# Patient Record
Sex: FEMALE | Race: WHITE | NOT HISPANIC OR LATINO
[De-identification: names, ages, dates, MRNs, and addresses within clinical notes are randomized per-mention and may not be internally consistent; named-entity substitution may affect disease eponyms.]

---

## 2019-09-26 ENCOUNTER — APPOINTMENT (OUTPATIENT)
Dept: ORTHOPEDIC SURGERY | Facility: CLINIC | Age: 69
End: 2019-09-26
Payer: COMMERCIAL

## 2019-09-26 VITALS — WEIGHT: 164 LBS | BODY MASS INDEX: 32.2 KG/M2 | HEIGHT: 60 IN

## 2019-09-26 PROCEDURE — 73562 X-RAY EXAM OF KNEE 3: CPT | Mod: 50

## 2019-09-26 PROCEDURE — 20610 DRAIN/INJ JOINT/BURSA W/O US: CPT | Mod: LT

## 2019-09-26 PROCEDURE — 99204 OFFICE O/P NEW MOD 45 MIN: CPT | Mod: 25

## 2019-12-26 ENCOUNTER — APPOINTMENT (OUTPATIENT)
Dept: ORTHOPEDIC SURGERY | Facility: CLINIC | Age: 69
End: 2019-12-26
Payer: COMMERCIAL

## 2019-12-26 VITALS — WEIGHT: 164 LBS | BODY MASS INDEX: 32.2 KG/M2 | HEIGHT: 60 IN

## 2019-12-26 PROCEDURE — 20611 DRAIN/INJ JOINT/BURSA W/US: CPT | Mod: LT

## 2020-01-29 ENCOUNTER — APPOINTMENT (OUTPATIENT)
Dept: ORTHOPEDIC SURGERY | Facility: CLINIC | Age: 70
End: 2020-01-29
Payer: COMMERCIAL

## 2020-01-29 VITALS — HEIGHT: 60 IN | WEIGHT: 164 LBS | BODY MASS INDEX: 32.2 KG/M2

## 2020-01-29 PROCEDURE — 99214 OFFICE O/P EST MOD 30 MIN: CPT

## 2020-03-12 ENCOUNTER — APPOINTMENT (OUTPATIENT)
Dept: ORTHOPEDIC SURGERY | Facility: CLINIC | Age: 70
End: 2020-03-12
Payer: COMMERCIAL

## 2020-03-12 PROCEDURE — 20611 DRAIN/INJ JOINT/BURSA W/US: CPT | Mod: RT

## 2020-03-12 PROCEDURE — 99214 OFFICE O/P EST MOD 30 MIN: CPT | Mod: 25

## 2020-03-12 NOTE — DISCUSSION/SUMMARY
[de-identified] : I went over the pathophysiology of the patient's symptoms in great detail and used models to aid in my explanation. She received a Monovisc injection to her right knee under ultrasound guidance and tolerated it well. The patient was remided the injection takes 4-6 weeks to take effect. We discussed the use of ice, Tylenol and anti-inflammatories to relieve pain. The patient was instructed in ROM exercises they are to do at home. At-home strengthening exercises were discussed and demonstrated with the patient.  At-home stretching exercises were discussed and demonstrated with the patient. I stressed the importance of continued weight loss and its benefits to the patient’s joints and overall health. She needs to avoid high-impact activities such as running and jumping. I recommend alternative activities such as riding a stationary bike on low tension, or the elliptical. She should focus on light weight and high repetition exercises. She  should avoid squatting, and kneeling. We went over the wide ranging benefits of exercise for the patient health and I encouraged her to begin a diligent exercise program. Patient understands she needs to consider a left knee replacement given she is losing motion and conservative measures are not providing enough relief. I informed the patient that surgery will be required to provide them long term relief from their symptoms. The proper pre and post operative procedures and expectations were discussed in extensive detail with the patient. We had a lengthy discussion about the patient's issues, and talked about the benefits and risks of the procedure. \par \par FU 6 weeks. \par \par Planned surgery and surgical risks/benefits of the procedure have been discussed in detail with the patient. A model of the implant and brand/type of implant being used was discussed & demonstrated to the patient. Patient was given options to go to a rehab center or to go home after surgery. Recent studies show that going home after surgery has a higher success rate. \par The natural history and treatment of degenerative arthritis was discussed with the patient at length today. The spectrum of treatment including nonoperative modalities to surgical intervention was elucidated. Noninvasive and nonoperative treatment modalities include weight reduction, activity modification with low impact exercise, as needed use of acetaminophen or anti-inflammatory medications if tolerated, glucosamine/chondroitin supplements, and physical therapy. Further treatments can include corticosteroid injection and the use of viscosupplementation with hyaluronic acid injections. Definitive surgical treatment can certainly include total joint arthroplasty also. The risks and benefits of each treatment options was discussed and all questions were answered.\par In view of lack of adequate pain relief with conservative (non-surgical) management protocol including physical therapy, home exercises, weight loss, activity modification, NSAIDS. I informed the patient the advantages and disadvantages of a staged Bilateral Total Knee Replacement.\par The risks, benefits, alternatives, implications, complications including but not limited to pain, stiffness, bleeding, limp, wound breakdown, infection, bone fracture, nerve and vascular compromise, implant wear and durability issues and rehabilitation were discussed and relevant questions were addressed. The possibility of recurrent pain, no improvement in pain and actual worsening of the pain were also mentioned in conversation with the patient. Medical complications related to the patient's general medical health including deep vein thrombosis, pulmonary embolus, heart attack, stroke, death and other complications from anesthesia were discussed as well. The patient wishes to proceed and will undergo preoperative medical evaluation and clearance, attend the preoperative educational class and will schedule surgery appropriately.\par Anticoagulation prophylaxis medication options to address risks of deep vein thrombosis and pulmonary embolism were discussed and weighed against the risks of bleeding and wound healing complications. The patient elected aspirin/coumadin prophylaxis with mechanical modalities.

## 2020-03-12 NOTE — ADDENDUM
[FreeTextEntry1] : I, Zeeshan Palacios, acted solely as a scribe for Dr. Brodie Patel on this date 03/12/2020 .\par All medical record entries made by the Scribe were at my, Dr. Brodie Patel, direction and personally dictated by me on 03/12/2020 . I have reviewed the chart and agree that the record accurately reflects my personal performance of the history, physical exam, assessment and plan. I have also personally directed, reviewed, and agreed with the chart.

## 2020-03-12 NOTE — PHYSICAL EXAM
[LE] : Sensory: Intact in bilateral lower extremities [de-identified] : Constitutional\par o Appearance : well-nourished, well developed, alert, in no acute distress \par Head and Face\par o Head :\par ¦ Inspection : atraumatic, normocephalic\par o Face :\par ¦ Inspection : no visible rash or discoloration\par Respiratory\par o Respiratory Effort: breathing unlabored \par Neurologic\par o Mental Status Examination :\par ¦ Orientation : alert and oriented X 3\par Psychiatric\par o Mood and Affect: mood normal, affect appropriate\par Cardiovascular\par o Observation/Palpation : - no swelling\par Lymphatic\par o Additional Nodes : No palpable lymph nodes present\par \par Right Lower Extremity\par o Buttock : no tenderness, swelling or deformities \par o Right Hip :\par    - Inspection/Palpation : no tenderness, no swelling or deformities\par    - Range of Motion : full and painless in all planes, no crepitance\par    - Stability : joint stability intact\par    - Strength : extension, flexion, adduction, abduction, internal rotation and external rotation 4+/5 \par    - Tests: Brian’s test negative\par o Right Knee :\par ¦ Inspection/Palpation : medial compartment tenderness, no swelling\par ¦ Range of Motion : 0-120 ° , no crepitance \par ¦ Stability : no valgus or varus instability present on provocative testing\par ¦ Strength : flexion and extension 5/5 \par ¦ Tests and Signs : negative Anterior Drawer, negative Lachman, negative Nick\par \par Left Lower Extremity\par o Buttock : no tenderness, swelling or deformities \par o Left Hip :\par    - Inspection/Palpation : no tenderness, no swelling or deformities\par    - Range of Motion : full and painless in all planes, no crepitance\par    - Stability : joint stability intact\par    - Strength : extension, flexion, adduction, abduction, internal rotation and external rotation 4+/5  \par    - Tests: Brian’s test negative\par \par o Left Knee :\par ¦ Inspection/Palpation : significant lateral compartment tenderness, no swelling, valgus deformity,\par ¦ Range of Motion : 0 - 120 with pain at the extremes of motion, no crepitance, decreased patellofemoral glide \par ¦ Stability : no valgus or varus instability present on provocative testing\par ¦ Strength : flexion and extension 4+/5 \par ¦ Tests and Signs : negative Anterior Drawer, negative Lachman, negative Nick\par \par DP/PT: 2+ bilaterally \par \par Gait and Station:\par Gait: gait normal, no significant extremity swelling or lymphedema, good proprioception and balance\par \par o Right knee Injection : Indication- knee osteoarthritis, Anatomic location- right intra-articular joint space, Spray - area was sterilized with Betadine and alcohol and anesthetized with Ethyl Chloride , needle used-20G, Medications given- 4cc's Monovisc, under ultrasound guidance and patient tolerated it well.\par o Lot: [id number]\par o Exp: 06/22

## 2020-03-12 NOTE — HISTORY OF PRESENT ILLNESS
[de-identified] : 69 year old female presents with bilateral knee pain. She notes that it has been bothering her for many years now but it worsened significantly over the summer. She does have occasional pain at night, as well. She notes some swelling as well, and has difficultly walking long distances. She takes Advil and Tylenol PRN to manage her pain. She received a cortisone injection into her left knee on 09/26/2019 and states that it provided her with significant symptom relief. She received a Monovisc injection into the left knee on 12/26/2019. She states her knee is "30% better". She is present for a mMnovisc injection to her right knee.  He is aware that she is in need of a knee replacement but right now would like to see the effect of one last round of injections.\par \par 09/2019 x-rays of the left knee revealed severe lateral and severe patellofemoral arthritis. Right knee: moderate medial and patellofemoral arthritis

## 2020-04-30 ENCOUNTER — APPOINTMENT (OUTPATIENT)
Dept: ORTHOPEDIC SURGERY | Facility: CLINIC | Age: 70
End: 2020-04-30

## 2021-03-29 ENCOUNTER — APPOINTMENT (OUTPATIENT)
Dept: ORTHOPEDIC SURGERY | Facility: CLINIC | Age: 71
End: 2021-03-29
Payer: COMMERCIAL

## 2021-03-29 PROCEDURE — 99214 OFFICE O/P EST MOD 30 MIN: CPT | Mod: 25

## 2021-03-29 PROCEDURE — 20610 DRAIN/INJ JOINT/BURSA W/O US: CPT | Mod: LT

## 2021-03-29 PROCEDURE — 99072 ADDL SUPL MATRL&STAF TM PHE: CPT

## 2021-03-29 PROCEDURE — 73562 X-RAY EXAM OF KNEE 3: CPT | Mod: 50

## 2021-03-29 NOTE — ADDENDUM
[FreeTextEntry1] : I, Gianni Rivera, acted solely as a scribe for Dr. Brodie Patel on this date 03/29/2021.\par All medical record entries made by the Scribe were at my, Dr. Brodie Patel, direction and personally dictated by me on 03/29/2021. I have reviewed the chart and agree that the record accurately reflects my personal performance of the history, physical exam, assessment and plan. I have also personally directed, reviewed, and agreed with the chart.

## 2021-03-29 NOTE — HISTORY OF PRESENT ILLNESS
[de-identified] : 70 year old female presents complaining of bilateral knee pain, left worse than right. She is known to have severe arthritis of both knees, left side worse than right. She was last seen in March 2020 for her knees. She had left knee Monovisc in 12/2019, and right knee Monovisc in 03/2020. She notes no buckling or swelling. She notes that she fell on her right knee on a hardwood floor while working at home. It became swollen for a couple of weeks, which did improve with rest and ice. She takes Advil and Tylenol as needed. She is aware that she is in need of a knee replacement. She is considering surgery for the end of June 2021 when her daughter can help her.

## 2021-03-29 NOTE — PHYSICAL EXAM
[LE] : Sensory: Intact in bilateral lower extremities [de-identified] : Constitutional\par o Appearance : well-nourished, well developed, alert, in no acute distress \par Head and Face\par o Head :\par ¦ Inspection : atraumatic, normocephalic\par o Face :\par ¦ Inspection : no visible rash or discoloration\par Respiratory\par o Respiratory Effort: breathing unlabored \par Neurologic\par o Mental Status Examination :\par ¦ Orientation : alert and oriented X 3\par Psychiatric\par o Mood and Affect: mood normal, affect appropriate\par Cardiovascular\par o Observation/Palpation : - no swelling\par Lymphatic\par o Additional Nodes : No palpable lymph nodes present\par \par Right Lower Extremity\par o Buttock : no tenderness, swelling or deformities \par o Right Hip :\par    - Inspection/Palpation : no tenderness, no swelling or deformities\par    - Range of Motion : full and painless in all planes, no crepitance\par    - Stability : joint stability intact\par    - Strength : extension, flexion, adduction, abduction, internal rotation and external rotation 4+/5 \par    - Tests: Brian’s test negative\par o Right Knee :\par ¦ Inspection/Palpation : medial compartment tenderness, no swelling\par ¦ Range of Motion : 0-120 ° , no crepitance \par ¦ Stability : no valgus or varus instability present on provocative testing\par ¦ Strength : flexion and extension 5/5 \par ¦ Tests and Signs : negative Anterior Drawer, negative Lachman, negative Nick\par \par Left Lower Extremity\par o Buttock : no tenderness, swelling or deformities \par o Left Hip :\par    - Inspection/Palpation : no tenderness, no swelling or deformities\par    - Range of Motion : full and painless in all planes, no crepitance\par    - Stability : joint stability intact\par    - Strength : extension, flexion, adduction, abduction, internal rotation and external rotation 4+/5  \par    - Tests: Brian’s test negative\par \par o Left Knee :\par ¦ Inspection/Palpation : significant medial and lateral compartment tenderness, no swelling, valgus deformity,\par ¦ Range of Motion : 0 -90 with pain on flexion past 90°, no crepitance, decreased patellofemoral glide \par ¦ Stability : no valgus or varus instability present on provocative testing\par ¦ Strength : flexion and extension 4+/5 \par ¦ Tests and Signs : negative Anterior Drawer, negative Lachman, negative Nick\par \par DP/PT: 2+ bilaterally \par \par Gait and Station:\par Gait: gait normal, no significant extremity swelling or lymphedema, good proprioception and balance\par \par Radiology Results:\par o Right Knee : Standing AP, lateral and tunnel views of the knee were obtained and revealed moderate to severe tricompartmental osteoarthritis worse in the lateral and patellofemoral compartment with bone on bone in the lateral compartment.\par o Left Knee : Standing AP, lateral and tunnel views of the knee were obtained and revealed bone on bone in the lateral compartment with severe patellofemoral arthritis.\par \par o Knee injection : Indication- knee osteoarthritis, Anatomic location- left intra-articular joint space, Spray - area was sterilized with Betadine and alcohol and anesthetized with Ethyl Chloride , needle used-20G, Medications given- 5cc's lidocaine, 0.5cc's kenalog, 0.5 cc's dexamethasone, and patient tolerated it well.

## 2021-03-29 NOTE — DISCUSSION/SUMMARY
[de-identified] : I discussed the underlying pathophysiology of the patient's condition in great detail with the patient. I went over the patient's x-rays with them in great detail. The extent of the patient’s arthritis was discussed in great detail with them. We discussed the use of ice, Tylenol and anti-inflammatories to relieve pain. I stressed the importance of weight loss and its benefits to the patient’s joints and overall health. The patient elected to receive a cortisone injection into her left knee today, and tolerated it well. I instructed the patient on ROM exercises, and told them to take it easy. The use of ice and rest was reviewed with the patient. The patient may resume activities tomorrow. \par I informed her that injections such as cortisone and Viscosupplementation are short term solutions, and that surgery will be required to provide them long term relief from their symptoms. Patient understands she needs to consider knee replacement given she is losing motion and conservative measures are not providing enough relief. The proper pre and post operative procedures and expectations were discussed in extensive detail with the patient. We had a lengthy discussion about the patient's issues, and talked about the benefits and risks of the procedure. She will need to obtain medical clearance including a COVID-19 test prior to surgery. All of her questions were answered. She understands and consents to the plan. \par \par FU 1-2 weeks.\par after a left knee cortisone injection today (03/29/2021).\par for a right knee cortisone injection.\par after scheduling her left TKR.\par \par The patient is an 70 year old female with bone on bone arthritis of their bilateral knees. Based upon the patient's continued symptoms and failure to respond to conservative treatment I have recommended a total knee replacement for this patient. A long discussion took place with the patient describing what a total joint replacement is and what the procedure would entail. A total knee model, similar to the implant that will be used during the operation was utilized to demonstrate and to discuss the various bearing surfaces of the implants. The hospitalization and post-operative care and rehabilitation were also discussed. The use of perioperative antibiotics and DVT prophylaxis were discussed. The risk, benefits and alternatives to a surgical intervention were discussed at length with the patient. The patient was also advised of risks related to the medical comorbidities and elevated body mass index (BMI). A lengthy discussion took place to review the most common complications including but not limited to: deep vein thrombosis, pulmonary embolus, heart attack, stroke, infection, wound breakdown, numbness, damage to nerves, tendon, muscles, arteries or other blood vessels, death and other possible complications from anesthesia. The patient was told that we will take steps to minimize these risks by using sterile technique, antibiotics and DVT prophylaxis when appropriate and follow the patient postoperatively in the office setting to monitor progress. The possibility of recurrent pain, no improvement in pain and actual worsening of pain were also discussed with the patient.\par \par The discharge plan of care focused on the patient going home following surgery. I encouraged the patient to make the necessary arrangements to have someone stay with them when they are discharged home. Following discharge, a home care nurse will visit the patient. They will open your home care case and request home physical therapy services. Home physical therapy will commence following discharge provided it is appropriate and covered by her health insurance benefit plan.\par \par The risks, benefits and alternative treatment options of total joint replacement were reviewed with the patient at great length. All questions were answered to the satisfaction of the patient. The patient participated in an interactive discussion of the total knee replacement implant planned for their surgery with questions answered. The patient agreed with the treatment plan, and has decided to move forward with the elective total knee replacement as planned.\par \par GAB WRIGHT was seen face to face and needs a commode for bedside use as the patient has no ability to acces the bathroom in their home. The patient also requires a rolling walker as this is needed for activities of daily living within their home secondary to the diagnosis of osteoarthritis.

## 2021-06-15 ENCOUNTER — OUTPATIENT (OUTPATIENT)
Dept: OUTPATIENT SERVICES | Facility: HOSPITAL | Age: 71
LOS: 1 days | End: 2021-06-15
Payer: COMMERCIAL

## 2021-06-15 VITALS
DIASTOLIC BLOOD PRESSURE: 76 MMHG | RESPIRATION RATE: 16 BRPM | WEIGHT: 222.01 LBS | OXYGEN SATURATION: 98 % | HEIGHT: 59 IN | TEMPERATURE: 97 F | HEART RATE: 88 BPM | SYSTOLIC BLOOD PRESSURE: 168 MMHG

## 2021-06-15 DIAGNOSIS — M17.12 UNILATERAL PRIMARY OSTEOARTHRITIS, LEFT KNEE: ICD-10-CM

## 2021-06-15 DIAGNOSIS — Z90.710 ACQUIRED ABSENCE OF BOTH CERVIX AND UTERUS: Chronic | ICD-10-CM

## 2021-06-15 DIAGNOSIS — Z01.818 ENCOUNTER FOR OTHER PREPROCEDURAL EXAMINATION: ICD-10-CM

## 2021-06-15 DIAGNOSIS — Z98.84 BARIATRIC SURGERY STATUS: Chronic | ICD-10-CM

## 2021-06-15 LAB
A1C WITH ESTIMATED AVERAGE GLUCOSE RESULT: 5.6 % — SIGNIFICANT CHANGE UP (ref 4–5.6)
ALBUMIN SERPL ELPH-MCNC: 3.5 G/DL — SIGNIFICANT CHANGE UP (ref 3.3–5)
ALP SERPL-CCNC: 101 U/L — SIGNIFICANT CHANGE UP (ref 30–120)
ALT FLD-CCNC: 26 U/L DA — SIGNIFICANT CHANGE UP (ref 10–60)
ANION GAP SERPL CALC-SCNC: 3 MMOL/L — LOW (ref 5–17)
APTT BLD: 33.9 SEC — SIGNIFICANT CHANGE UP (ref 27.5–35.5)
AST SERPL-CCNC: 23 U/L — SIGNIFICANT CHANGE UP (ref 10–40)
BILIRUB SERPL-MCNC: 0.6 MG/DL — SIGNIFICANT CHANGE UP (ref 0.2–1.2)
BLD GP AB SCN SERPL QL: SIGNIFICANT CHANGE UP
BUN SERPL-MCNC: 15 MG/DL — SIGNIFICANT CHANGE UP (ref 7–23)
CALCIUM SERPL-MCNC: 9.3 MG/DL — SIGNIFICANT CHANGE UP (ref 8.4–10.5)
CHLORIDE SERPL-SCNC: 103 MMOL/L — SIGNIFICANT CHANGE UP (ref 96–108)
CO2 SERPL-SCNC: 32 MMOL/L — HIGH (ref 22–31)
CREAT SERPL-MCNC: 0.65 MG/DL — SIGNIFICANT CHANGE UP (ref 0.5–1.3)
ESTIMATED AVERAGE GLUCOSE: 114 MG/DL — SIGNIFICANT CHANGE UP (ref 68–114)
GLUCOSE SERPL-MCNC: 99 MG/DL — SIGNIFICANT CHANGE UP (ref 70–99)
HCT VFR BLD CALC: 41.2 % — SIGNIFICANT CHANGE UP (ref 34.5–45)
HGB BLD-MCNC: 13.8 G/DL — SIGNIFICANT CHANGE UP (ref 11.5–15.5)
INR BLD: 1.03 RATIO — SIGNIFICANT CHANGE UP (ref 0.88–1.16)
MCHC RBC-ENTMCNC: 31.7 PG — SIGNIFICANT CHANGE UP (ref 27–34)
MCHC RBC-ENTMCNC: 33.5 GM/DL — SIGNIFICANT CHANGE UP (ref 32–36)
MCV RBC AUTO: 94.5 FL — SIGNIFICANT CHANGE UP (ref 80–100)
NRBC # BLD: 0 /100 WBCS — SIGNIFICANT CHANGE UP (ref 0–0)
PLATELET # BLD AUTO: 242 K/UL — SIGNIFICANT CHANGE UP (ref 150–400)
POTASSIUM SERPL-MCNC: 3.9 MMOL/L — SIGNIFICANT CHANGE UP (ref 3.5–5.3)
POTASSIUM SERPL-SCNC: 3.9 MMOL/L — SIGNIFICANT CHANGE UP (ref 3.5–5.3)
PROT SERPL-MCNC: 7.6 G/DL — SIGNIFICANT CHANGE UP (ref 6–8.3)
PROTHROM AB SERPL-ACNC: 12.4 SEC — SIGNIFICANT CHANGE UP (ref 10.6–13.6)
RBC # BLD: 4.36 M/UL — SIGNIFICANT CHANGE UP (ref 3.8–5.2)
RBC # FLD: 13.3 % — SIGNIFICANT CHANGE UP (ref 10.3–14.5)
SODIUM SERPL-SCNC: 138 MMOL/L — SIGNIFICANT CHANGE UP (ref 135–145)
WBC # BLD: 6.55 K/UL — SIGNIFICANT CHANGE UP (ref 3.8–10.5)
WBC # FLD AUTO: 6.55 K/UL — SIGNIFICANT CHANGE UP (ref 3.8–10.5)

## 2021-06-15 PROCEDURE — G0463: CPT

## 2021-06-15 PROCEDURE — 93005 ELECTROCARDIOGRAM TRACING: CPT

## 2021-06-15 PROCEDURE — 93010 ELECTROCARDIOGRAM REPORT: CPT

## 2021-06-15 NOTE — H&P PST ADULT - NSICDXPASTMEDICALHX_GEN_ALL_CORE_FT
PAST MEDICAL HISTORY:  Diabetes resolved after bariatric surgery    HTN (Hypertension) no meds    Obstructive Sleep Apnea no device    Trace Cataracts     Uterine Cancer 2011, surgery     PAST MEDICAL HISTORY:  COVID-19 vaccine series completed 4/3/21    Diabetes resolved after bariatric surgery    HTN (Hypertension) no meds    Morbid obesity with body mass index (BMI) of 40.0 to 44.9 in adult     Obstructive Sleep Apnea no device    Trace Cataracts     Uterine Cancer 2011, surgery

## 2021-06-15 NOTE — H&P PST ADULT - ASSESSMENT
72 y/o female with left knee pain  Planned surgery.- lefty total knee replacement  Will obtain medical clearance  no covid testing  Pre op instructions provided  Instructions provided on medications to continue and to take the day morning of surgery

## 2021-06-15 NOTE — H&P PST ADULT - HISTORY OF PRESENT ILLNESS
72 y/o female with h/o arthritis with left knee pain. Failed conservative therapy. Takes advil as needed with some relief. Seen for PST in NAD

## 2021-06-15 NOTE — H&P PST ADULT - NSICDXPASTSURGICALHX_GEN_ALL_CORE_FT
PAST SURGICAL HISTORY:  H/O bariatric surgery [id number]. lost 70 lbs sleeve gastrectomy    H/O total hysterectomy 2011    Hand Pain right hand cyst removed on palm, 2010    Incisional Hernia 2003    S/P Cholecystectomy 1999    S/P D&C 1980

## 2021-06-16 LAB
MRSA PCR RESULT.: SIGNIFICANT CHANGE UP
S AUREUS DNA NOSE QL NAA+PROBE: SIGNIFICANT CHANGE UP

## 2021-06-23 ENCOUNTER — APPOINTMENT (OUTPATIENT)
Dept: ORTHOPEDIC SURGERY | Facility: CLINIC | Age: 71
End: 2021-06-23
Payer: COMMERCIAL

## 2021-06-23 PROCEDURE — 99214 OFFICE O/P EST MOD 30 MIN: CPT

## 2021-06-23 PROCEDURE — 99072 ADDL SUPL MATRL&STAF TM PHE: CPT

## 2021-06-23 PROCEDURE — 73562 X-RAY EXAM OF KNEE 3: CPT | Mod: LT

## 2021-06-23 NOTE — PHYSICAL EXAM
[LE] : Sensory: Intact in bilateral lower extremities [de-identified] : Constitutional\par o Appearance : well-nourished, well developed, alert, in no acute distress \par Head and Face\par o Head :\par ¦ Inspection : atraumatic, normocephalic\par o Face :\par ¦ Inspection : no visible rash or discoloration\par Respiratory\par o Respiratory Effort: breathing unlabored \par Neurologic\par o Mental Status Examination :\par ¦ Orientation : alert and oriented X 3\par Psychiatric\par o Mood and Affect: mood normal, affect appropriate\par Cardiovascular\par o Observation/Palpation : - no swelling\par Lymphatic\par o Additional Nodes : No palpable lymph nodes present\par \par Right Lower Extremity\par o Buttock : no tenderness, swelling or deformities \par o Right Hip :\par    - Inspection/Palpation : no tenderness, no swelling or deformities\par    - Range of Motion : full and painless in all planes, no crepitance\par    - Stability : joint stability intact\par    - Strength : extension, flexion, adduction, abduction, internal rotation and external rotation 4+/5 \par    - Tests: Brian’s test negative\par o Right Knee :\par ¦ Inspection/Palpation : medial compartment tenderness, no swelling\par ¦ Range of Motion : 0-120 ° , no crepitance \par ¦ Stability : no valgus or varus instability present on provocative testing\par ¦ Strength : flexion and extension 5/5 \par ¦ Tests and Signs : negative Anterior Drawer, negative Lachman, negative Nick\par \par Left Lower Extremity\par o Buttock : no tenderness, swelling or deformities \par o Left Hip :\par    - Inspection/Palpation : no tenderness, no swelling or deformities\par    - Range of Motion : full and painless in all planes, no crepitance\par    - Stability : joint stability intact\par    - Strength : extension, flexion, adduction, abduction, internal rotation and external rotation 4+/5  \par    - Tests: Brian’s test negative\par \par o Left Knee :\par ¦ Inspection/Palpation : significant medial and lateral compartment tenderness, no swelling, valgus deformity,\par ¦ Range of Motion : 0 -90 with pain on flexion past 90°, no crepitance, decreased patellofemoral glide \par ¦ Stability : no valgus or varus instability present on provocative testing\par ¦ Strength : flexion and extension 5/5 \par ¦ Tests and Signs : negative Anterior Drawer, negative Lachman, negative Nick\par \par DP/PT: 2+ bilaterally \par \par Gait and Station:\par Gait: stiff on the left , no significant extremity swelling or lymphedema, good proprioception and balance\par \par Radiology Results:\par o Left Knee : Templated films of the knee were obtained and revealed bone on bone in the lateral compartment with severe patellofemoral arthritis.\par \par

## 2021-06-23 NOTE — HISTORY OF PRESENT ILLNESS
[de-identified] : 70 year old female presents complaining of bilateral knee pain, left worse than right. She is known to have severe arthritis of both knees, left side worse than right. She was last seen in March 2020 for her knees. She had left knee Monovisc in 12/2019, and right knee Monovisc in 03/2020. She notes no buckling or swelling. She notes that she fell on her right knee on a hardwood floor while working at home. It became swollen for a couple of weeks, which did improve with rest and ice. She takes Advil and Tylenol as needed. She is aware that she is in need of a knee replacement. She is considering surgery for the end of June 2021 when her daughter can help her. Patient returns today on 6/23/21, after receiving an injection at her last visit on 3/29/21. She returns today for templated films. Patient was seen by here PCP, Dr. Lee on 6/22/21. She was just placed on a blood pressure medication. She has no allergies to antibiotics, aspirin. No strokes, heart attacks. No allergies to metal.  Patient denies history of back surgery.\par \par Her BMI is 44.4, weight = 220 pounds, Height = 4 feet, 11 inches).		 \par \par Patient is fully COVID vaccinated as of February 2021.

## 2021-06-23 NOTE — END OF VISIT
[FreeTextEntry3] : All medical record entries made by the Scribe were at my,  Dr. Brodie Patel MD., direction and personally dictated by me on 06/23/2021. I have personally reviewed the chart and agree that the record accurately reflects my personal performance of the history, physical exam, assessment and plan.\par \par

## 2021-06-23 NOTE — DISCUSSION/SUMMARY
[de-identified] : The patient is an 71 year old female with bone on bone arthritis of their left knee. Based upon the patient's continued symptoms and failure to respond to conservative treatment I have recommended a total knee replacement for this patient. A long discussion took place with the patient describing what a total joint replacement is and what the procedure would entail. A total knee model, similar to the implant that will be used during the operation was utilized to demonstrate and to discuss the various bearing surfaces of the implants. The hospitalization and post-operative care and rehabilitation were also discussed. The use of perioperative antibiotics and DVT prophylaxis were discussed. The risk, benefits and alternatives to a surgical intervention were discussed at length with the patient. The patient was also advised of risks related to the medical comorbidities and elevated body mass index (BMI). A lengthy discussion took place to review the most common complications including but not limited to: deep vein thrombosis, pulmonary embolus, heart attack, stroke, infection, wound breakdown, numbness, damage to nerves, tendon, muscles, arteries or other blood vessels, death and other possible complications from anesthesia. The patient was told that we will take steps to minimize these risks by using sterile technique, antibiotics and DVT prophylaxis when appropriate and follow the patient postoperatively in the office setting to monitor progress. The possibility of recurrent pain, no improvement in pain and actual worsening of pain were also discussed with the patient.\par \par The discharge plan of care focused on the patient going home following surgery. I encouraged the patient to make the necessary arrangements to have someone stay with them when they are discharged home. Following discharge, a home care nurse will visit the patient. They will open your home care case and request home physical therapy services. Home physical therapy will commence following discharge provided it is appropriate and covered by her health insurance benefit plan.\par \par The risks, benefits and alternative treatment options of total joint replacement were reviewed with the patient at great length. All questions were answered to the satisfaction of the patient. The patient participated in an interactive discussion of the total knee replacement implant planned for their surgery with questions answered. The patient agreed with the treatment plan, and has decided to move forward with the elective total knee replacement as planned.\par \par GAB WRIGHT was seen face to face and needs a commode for bedside use as the patient has no ability to acces the bathroom in their home. The patient also requires a rolling walker as this is needed for activities of daily living within their home secondary to the diagnosis of osteoarthritis.

## 2021-06-23 NOTE — ADDENDUM
[FreeTextEntry1] : I, Jeimy Padilla wrote this note acting as a scribe for Dr. Brodie Patel on Jun 23, 2021.\par \par

## 2021-06-28 ENCOUNTER — FORM ENCOUNTER (OUTPATIENT)
Age: 71
End: 2021-06-28

## 2021-06-28 ENCOUNTER — TRANSCRIPTION ENCOUNTER (OUTPATIENT)
Age: 71
End: 2021-06-28

## 2021-06-28 NOTE — PATIENT PROFILE ADULT - FUNCTIONAL SCREEN CURRENT LEVEL: SWALLOWING (IF SCORE 2 OR MORE FOR ANY ITEM, CONSULT REHAB SERVICES), MLM)
Letter by Yareli Moore CNP at      Author: Yareli Moore CNP Service: -- Author Type: --    Filed:  Encounter Date: 1/25/2021 Status: (Other)         [facility]  [address] [place] [address]                                  February 8, 2021    Patient: Abeba Martinez   MR Number: [id number]   YOB: 1937   Date of Visit: 1/25/2021     Dear Dr. Alvarenga:    Thank you for referring Abeba Martinez to me for evaluation. Below are the relevant portions of my assessment and plan of care.    If you have questions, please do not hesitate to call me. I look forward to following Abeba along with you.    Sincerely,        Yareli Moore CNP          CC  No Recipients  Yareli Moore CNP  2/8/2021  3:56 PM  Sign when Signing Visit  [facility]    Facility:   [facility] [[id number]]   Code Status: POLST AVAILABLE      CHIEF COMPLAINT/REASON FOR VISIT:  Chief Complaint   Patient presents with   ? Hospital Visit Follow Up       HISTORY:      HPI: Abeba is a 83 y.o. female history of CKD, GERD, low back pain and opioid dependence who was seen in the emergency room with failure to thrive and generalized weakness.  She was thought to have had a community-acquired pneumonia and is being treated for that although she says that she does not have pneumonia.  She typically lives at home in her own apartment and her daughter stops by every couple weeks to help with showering and groceries.  She is limited in her history and does not want to engage much in conversation, rest of her history is obtained through chart review which is also minimal if she did not have a hospitalization and was only seen in the ER at [facility].    Today she is seen sitting up on the side of the bed, she is able to put on her own clothes well and sitting in the room with her.  She is quite capable with her ADLs but does remain weak.   Labs today with hemoglobin 9.4 and sodium 131.  Magnesium stable.  Appetite has been poor and she is working on increasing p.o. intake and fluids.  She is otherwise denying any pain currently, cough, fevers, chest pain, abdominal pain, diarrhea, constipation.    Past Medical History:   Diagnosis Date   ? Adult failure to thrive    ? PEDRO (acute kidney injury) (H)    ? Altered mental status    ? Chronic low back pain    ? CKD (chronic kidney disease)    ? Elevated serum creatinine    ? GERD (gastroesophageal reflux disease)    ? Lumbar canal stenosis    ? Milk alkali syndrome              Family History   Problem Relation Age of Onset   ? Alcohol abuse Father    ? Lung cancer Father    ? Diabetes Father    ? Depression Brother         suicide     Social History     Socioeconomic History   ? Marital status:      Spouse name: Not on file   ? Number of children: Not on file   ? Years of education: Not on file   ? Highest education level: Not on file   Occupational History   ? Not on file   Social Needs   ? Financial resource strain: Not on file   ? Food insecurity     Worry: Not on file     Inability: Not on file   ? Transportation needs     Medical: Not on file     Non-medical: Not on file   Tobacco Use   ? Smoking status: Former Smoker     Packs/day: 0.50     Types: Cigarettes   ? Smokeless tobacco: Never Used   Substance and Sexual Activity   ? Alcohol use: Yes   ? Drug use: Not on file   ? Sexual activity: Not on file   Lifestyle   ? Physical activity     Days per week: Not on file     Minutes per session: Not on file   ? Stress: Not on file   Relationships   ? Social connections     Talks on phone: Not on file     Gets together: Not on file     Attends [employer] service: Not on file     Active member of club or organization: Not on file     Attends meetings of clubs or organizations: Not on file     Relationship status: Not on file   ? Intimate partner violence     Fear of current or ex partner: Not on file  "    Emotionally abused: Not on file     Physically abused: Not on file     Forced sexual activity: Not on file   Other Topics Concern   ? Not on file   Social History Narrative   ? Not on file         Review of Systems   Constitutional: Negative.    HENT: Negative.    Eyes: Negative.    Respiratory: Negative.    Cardiovascular: Negative.    Endocrine: Negative.    Genitourinary: Negative.    Musculoskeletal: Positive for back pain.   Neurological: Positive for weakness. Negative for dizziness and headaches.   Hematological: Negative.    Psychiatric/Behavioral: Positive for agitation. Negative for behavioral problems.       Vitals:    01/25/21 1514   BP: 148/57   Pulse: (!) 103   Resp: 18   Temp: 97.1  F (36.2  C)   SpO2: 93%   Weight: 112 lb 6.4 oz (51 kg)   Height: 5' 5\" (1.651 m)       Physical Exam  Constitutional:       Appearance: Normal appearance. She is not ill-appearing.   HENT:      Head: Atraumatic.   Eyes:      General: No scleral icterus.  Cardiovascular:      Rate and Rhythm: Normal rate and regular rhythm.   Pulmonary:      Effort: Pulmonary effort is normal.      Breath sounds: Normal breath sounds.   Abdominal:      General: There is no distension.      Palpations: Abdomen is soft.      Tenderness: There is no abdominal tenderness.   Musculoskeletal: Normal range of motion.         General: No swelling.   Skin:     Comments: Red, dry pustualar rash R neck   Neurological:      General: No focal deficit present.      Mental Status: She is alert and oriented to person, place, and time.      Motor: Weakness present.      Gait: Gait normal.           LABS:   Reviewed.     ASSESSMENT:      ICD-10-CM    1. FTT (failure to thrive) in adult  R62.7    2. unilateral rash  R21    3. PEDRO (acute kidney injury) (H)  N17.9        PLAN:      Failure to thrive  PEDRO: I do think she has some hypovolemia and poor p.o. intake, will monitor her labs and recheck on Thursday to monitor kidney function.  Encourage good p.o. " intake.  She is asking about discharge today however I discussed this with staff who says they have spoken with her daughter who believes she has some cognitive impairment and she does not want her discharging right away without some more therapies.  At this time we will continue with the current plan for therapies and monitor for improvement in renal function and p.o. intake.  -BMP on Thursday.    GERD: Denies any active symptoms.  Takes famotidine.    Low back pain and lumbosacral disease: Continues on tramadol which we can monitor use of.      Electronically signed by: Yareli Moore, CNP          0 = swallows foods/liquids without difficulty

## 2021-06-29 ENCOUNTER — TRANSCRIPTION ENCOUNTER (OUTPATIENT)
Age: 71
End: 2021-06-29

## 2021-06-29 ENCOUNTER — APPOINTMENT (OUTPATIENT)
Dept: ORTHOPEDIC SURGERY | Facility: HOSPITAL | Age: 71
End: 2021-06-29

## 2021-06-29 ENCOUNTER — RESULT REVIEW (OUTPATIENT)
Age: 71
End: 2021-06-29

## 2021-06-29 ENCOUNTER — INPATIENT (INPATIENT)
Facility: HOSPITAL | Age: 71
LOS: 0 days | Discharge: HOME | DRG: 470 | End: 2021-06-30
Attending: SPECIALIST | Admitting: SPECIALIST
Payer: COMMERCIAL

## 2021-06-29 VITALS
HEIGHT: 59 IN | SYSTOLIC BLOOD PRESSURE: 165 MMHG | OXYGEN SATURATION: 96 % | WEIGHT: 216.49 LBS | RESPIRATION RATE: 16 BRPM | TEMPERATURE: 98 F | DIASTOLIC BLOOD PRESSURE: 86 MMHG | HEART RATE: 78 BPM

## 2021-06-29 DIAGNOSIS — Z98.84 BARIATRIC SURGERY STATUS: Chronic | ICD-10-CM

## 2021-06-29 DIAGNOSIS — Z90.710 ACQUIRED ABSENCE OF BOTH CERVIX AND UTERUS: Chronic | ICD-10-CM

## 2021-06-29 DIAGNOSIS — M17.12 UNILATERAL PRIMARY OSTEOARTHRITIS, LEFT KNEE: ICD-10-CM

## 2021-06-29 LAB
ANION GAP SERPL CALC-SCNC: 10 MMOL/L — SIGNIFICANT CHANGE UP (ref 5–17)
BUN SERPL-MCNC: 13 MG/DL — SIGNIFICANT CHANGE UP (ref 7–23)
CALCIUM SERPL-MCNC: 8.3 MG/DL — LOW (ref 8.4–10.5)
CHLORIDE SERPL-SCNC: 103 MMOL/L — SIGNIFICANT CHANGE UP (ref 96–108)
CO2 SERPL-SCNC: 24 MMOL/L — SIGNIFICANT CHANGE UP (ref 22–31)
CREAT SERPL-MCNC: 0.68 MG/DL — SIGNIFICANT CHANGE UP (ref 0.5–1.3)
GLUCOSE SERPL-MCNC: 306 MG/DL — HIGH (ref 70–99)
HCT VFR BLD CALC: 34.5 % — SIGNIFICANT CHANGE UP (ref 34.5–45)
HGB BLD-MCNC: 11.5 G/DL — SIGNIFICANT CHANGE UP (ref 11.5–15.5)
POTASSIUM SERPL-MCNC: 3.7 MMOL/L — SIGNIFICANT CHANGE UP (ref 3.5–5.3)
POTASSIUM SERPL-SCNC: 3.7 MMOL/L — SIGNIFICANT CHANGE UP (ref 3.5–5.3)
SODIUM SERPL-SCNC: 137 MMOL/L — SIGNIFICANT CHANGE UP (ref 135–145)

## 2021-06-29 PROCEDURE — 73560 X-RAY EXAM OF KNEE 1 OR 2: CPT | Mod: 26,LT

## 2021-06-29 PROCEDURE — 88311 DECALCIFY TISSUE: CPT | Mod: 26

## 2021-06-29 PROCEDURE — 27447 TOTAL KNEE ARTHROPLASTY: CPT | Mod: LT

## 2021-06-29 PROCEDURE — 88305 TISSUE EXAM BY PATHOLOGIST: CPT | Mod: 26

## 2021-06-29 PROCEDURE — 99223 1ST HOSP IP/OBS HIGH 75: CPT

## 2021-06-29 RX ORDER — TRANEXAMIC ACID 100 MG/ML
1000 INJECTION, SOLUTION INTRAVENOUS ONCE
Refills: 0 | Status: COMPLETED | OUTPATIENT
Start: 2021-06-29 | End: 2021-06-29

## 2021-06-29 RX ORDER — CEFAZOLIN SODIUM 1 G
2000 VIAL (EA) INJECTION EVERY 8 HOURS
Refills: 0 | Status: COMPLETED | OUTPATIENT
Start: 2021-06-29 | End: 2021-06-30

## 2021-06-29 RX ORDER — APIXABAN 2.5 MG/1
2.5 TABLET, FILM COATED ORAL EVERY 12 HOURS
Refills: 0 | Status: DISCONTINUED | OUTPATIENT
Start: 2021-06-30 | End: 2021-06-30

## 2021-06-29 RX ORDER — ACETAMINOPHEN 500 MG
1000 TABLET ORAL ONCE
Refills: 0 | Status: COMPLETED | OUTPATIENT
Start: 2021-06-29 | End: 2021-06-29

## 2021-06-29 RX ORDER — PANTOPRAZOLE SODIUM 20 MG/1
40 TABLET, DELAYED RELEASE ORAL
Refills: 0 | Status: DISCONTINUED | OUTPATIENT
Start: 2021-06-29 | End: 2021-06-30

## 2021-06-29 RX ORDER — CHLORHEXIDINE GLUCONATE 213 G/1000ML
1 SOLUTION TOPICAL ONCE
Refills: 0 | Status: COMPLETED | OUTPATIENT
Start: 2021-06-29 | End: 2021-06-29

## 2021-06-29 RX ORDER — OXYCODONE HYDROCHLORIDE 5 MG/1
5 TABLET ORAL
Refills: 0 | Status: DISCONTINUED | OUTPATIENT
Start: 2021-06-29 | End: 2021-06-30

## 2021-06-29 RX ORDER — ASPIRIN/CALCIUM CARB/MAGNESIUM 324 MG
1 TABLET ORAL
Qty: 56 | Refills: 0
Start: 2021-06-29 | End: 2021-07-26

## 2021-06-29 RX ORDER — SODIUM CHLORIDE 9 MG/ML
500 INJECTION INTRAMUSCULAR; INTRAVENOUS; SUBCUTANEOUS ONCE
Refills: 0 | Status: COMPLETED | OUTPATIENT
Start: 2021-06-29 | End: 2021-06-29

## 2021-06-29 RX ORDER — OXYCODONE HYDROCHLORIDE 5 MG/1
10 TABLET ORAL
Refills: 0 | Status: DISCONTINUED | OUTPATIENT
Start: 2021-06-29 | End: 2021-06-30

## 2021-06-29 RX ORDER — APREPITANT 80 MG/1
40 CAPSULE ORAL ONCE
Refills: 0 | Status: COMPLETED | OUTPATIENT
Start: 2021-06-29 | End: 2021-06-29

## 2021-06-29 RX ORDER — SENNA PLUS 8.6 MG/1
2 TABLET ORAL
Qty: 0 | Refills: 0 | DISCHARGE
Start: 2021-06-29

## 2021-06-29 RX ORDER — ONDANSETRON 8 MG/1
4 TABLET, FILM COATED ORAL ONCE
Refills: 0 | Status: DISCONTINUED | OUTPATIENT
Start: 2021-06-29 | End: 2021-06-29

## 2021-06-29 RX ORDER — CEFAZOLIN SODIUM 1 G
2000 VIAL (EA) INJECTION ONCE
Refills: 0 | Status: COMPLETED | OUTPATIENT
Start: 2021-06-29 | End: 2021-06-29

## 2021-06-29 RX ORDER — SODIUM CHLORIDE 9 MG/ML
1000 INJECTION, SOLUTION INTRAVENOUS
Refills: 0 | Status: DISCONTINUED | OUTPATIENT
Start: 2021-06-29 | End: 2021-06-30

## 2021-06-29 RX ORDER — ACETAMINOPHEN 500 MG
1000 TABLET ORAL ONCE
Refills: 0 | Status: COMPLETED | OUTPATIENT
Start: 2021-06-30 | End: 2021-06-30

## 2021-06-29 RX ORDER — ACETAMINOPHEN 500 MG
1000 TABLET ORAL EVERY 8 HOURS
Refills: 0 | Status: DISCONTINUED | OUTPATIENT
Start: 2021-06-30 | End: 2021-06-30

## 2021-06-29 RX ORDER — SENNA PLUS 8.6 MG/1
2 TABLET ORAL AT BEDTIME
Refills: 0 | Status: DISCONTINUED | OUTPATIENT
Start: 2021-06-29 | End: 2021-06-30

## 2021-06-29 RX ORDER — HYDROMORPHONE HYDROCHLORIDE 2 MG/ML
0.5 INJECTION INTRAMUSCULAR; INTRAVENOUS; SUBCUTANEOUS
Refills: 0 | Status: DISCONTINUED | OUTPATIENT
Start: 2021-06-29 | End: 2021-06-30

## 2021-06-29 RX ORDER — APIXABAN 2.5 MG/1
1 TABLET, FILM COATED ORAL
Qty: 23 | Refills: 0
Start: 2021-06-29 | End: 2021-07-10

## 2021-06-29 RX ORDER — ACETAMINOPHEN 500 MG
2 TABLET ORAL
Qty: 0 | Refills: 0 | DISCHARGE
Start: 2021-06-29 | End: 2021-07-13

## 2021-06-29 RX ORDER — POLYETHYLENE GLYCOL 3350 17 G/17G
17 POWDER, FOR SOLUTION ORAL AT BEDTIME
Refills: 0 | Status: DISCONTINUED | OUTPATIENT
Start: 2021-06-29 | End: 2021-06-30

## 2021-06-29 RX ORDER — SODIUM CHLORIDE 9 MG/ML
1000 INJECTION, SOLUTION INTRAVENOUS
Refills: 0 | Status: DISCONTINUED | OUTPATIENT
Start: 2021-06-29 | End: 2021-06-29

## 2021-06-29 RX ORDER — HYDROMORPHONE HYDROCHLORIDE 2 MG/ML
0.5 INJECTION INTRAMUSCULAR; INTRAVENOUS; SUBCUTANEOUS
Refills: 0 | Status: DISCONTINUED | OUTPATIENT
Start: 2021-06-29 | End: 2021-06-29

## 2021-06-29 RX ORDER — MAGNESIUM HYDROXIDE 400 MG/1
30 TABLET, CHEWABLE ORAL DAILY
Refills: 0 | Status: DISCONTINUED | OUTPATIENT
Start: 2021-06-29 | End: 2021-06-30

## 2021-06-29 RX ORDER — POLYETHYLENE GLYCOL 3350 17 G/17G
17 POWDER, FOR SOLUTION ORAL
Qty: 0 | Refills: 0 | DISCHARGE
Start: 2021-06-29

## 2021-06-29 RX ORDER — CELECOXIB 200 MG/1
200 CAPSULE ORAL EVERY 12 HOURS
Refills: 0 | Status: DISCONTINUED | OUTPATIENT
Start: 2021-06-29 | End: 2021-06-30

## 2021-06-29 RX ORDER — OMEPRAZOLE 10 MG/1
1 CAPSULE, DELAYED RELEASE ORAL
Qty: 30 | Refills: 1
Start: 2021-06-29 | End: 2021-08-27

## 2021-06-29 RX ORDER — CELECOXIB 200 MG/1
1 CAPSULE ORAL
Qty: 60 | Refills: 0
Start: 2021-06-29 | End: 2021-07-28

## 2021-06-29 RX ORDER — LOSARTAN POTASSIUM 100 MG/1
50 TABLET, FILM COATED ORAL DAILY
Refills: 0 | Status: DISCONTINUED | OUTPATIENT
Start: 2021-07-01 | End: 2021-06-30

## 2021-06-29 RX ADMIN — SODIUM CHLORIDE 1000 MILLILITER(S): 9 INJECTION INTRAMUSCULAR; INTRAVENOUS; SUBCUTANEOUS at 16:38

## 2021-06-29 RX ADMIN — APREPITANT 40 MILLIGRAM(S): 80 CAPSULE ORAL at 07:58

## 2021-06-29 RX ADMIN — SODIUM CHLORIDE 100 MILLILITER(S): 9 INJECTION, SOLUTION INTRAVENOUS at 21:47

## 2021-06-29 RX ADMIN — Medication 1000 MILLIGRAM(S): at 17:00

## 2021-06-29 RX ADMIN — CHLORHEXIDINE GLUCONATE 1 APPLICATION(S): 213 SOLUTION TOPICAL at 07:58

## 2021-06-29 RX ADMIN — Medication 100 MILLIGRAM(S): at 17:26

## 2021-06-29 RX ADMIN — CELECOXIB 200 MILLIGRAM(S): 200 CAPSULE ORAL at 21:47

## 2021-06-29 RX ADMIN — HYDROMORPHONE HYDROCHLORIDE 0.5 MILLIGRAM(S): 2 INJECTION INTRAMUSCULAR; INTRAVENOUS; SUBCUTANEOUS at 13:40

## 2021-06-29 RX ADMIN — HYDROMORPHONE HYDROCHLORIDE 0.5 MILLIGRAM(S): 2 INJECTION INTRAMUSCULAR; INTRAVENOUS; SUBCUTANEOUS at 12:08

## 2021-06-29 RX ADMIN — Medication 400 MILLIGRAM(S): at 21:47

## 2021-06-29 RX ADMIN — SODIUM CHLORIDE 1000 MILLILITER(S): 9 INJECTION INTRAMUSCULAR; INTRAVENOUS; SUBCUTANEOUS at 11:45

## 2021-06-29 RX ADMIN — HYDROMORPHONE HYDROCHLORIDE 0.5 MILLIGRAM(S): 2 INJECTION INTRAMUSCULAR; INTRAVENOUS; SUBCUTANEOUS at 13:43

## 2021-06-29 RX ADMIN — OXYCODONE HYDROCHLORIDE 10 MILLIGRAM(S): 5 TABLET ORAL at 23:40

## 2021-06-29 RX ADMIN — HYDROMORPHONE HYDROCHLORIDE 0.5 MILLIGRAM(S): 2 INJECTION INTRAMUSCULAR; INTRAVENOUS; SUBCUTANEOUS at 14:24

## 2021-06-29 RX ADMIN — HYDROMORPHONE HYDROCHLORIDE 0.5 MILLIGRAM(S): 2 INJECTION INTRAMUSCULAR; INTRAVENOUS; SUBCUTANEOUS at 11:45

## 2021-06-29 RX ADMIN — HYDROMORPHONE HYDROCHLORIDE 0.5 MILLIGRAM(S): 2 INJECTION INTRAMUSCULAR; INTRAVENOUS; SUBCUTANEOUS at 13:44

## 2021-06-29 RX ADMIN — HYDROMORPHONE HYDROCHLORIDE 0.5 MILLIGRAM(S): 2 INJECTION INTRAMUSCULAR; INTRAVENOUS; SUBCUTANEOUS at 12:41

## 2021-06-29 RX ADMIN — SODIUM CHLORIDE 100 MILLILITER(S): 9 INJECTION, SOLUTION INTRAVENOUS at 16:17

## 2021-06-29 RX ADMIN — OXYCODONE HYDROCHLORIDE 10 MILLIGRAM(S): 5 TABLET ORAL at 23:05

## 2021-06-29 RX ADMIN — SODIUM CHLORIDE 75 MILLILITER(S): 9 INJECTION, SOLUTION INTRAVENOUS at 11:45

## 2021-06-29 RX ADMIN — Medication 400 MILLIGRAM(S): at 16:16

## 2021-06-29 NOTE — DISCHARGE NOTE NURSING/CASE MANAGEMENT/SOCIAL WORK - NSDCPEELIQUISREACT_GEN_ALL_CORE
Yes, record another set of vital signs Apixaban/Eliquis increases your risk for bleeding. Notify your doctor if you experience any of the following side effects: bleeding, coughing or vomiting blood, red or black stool, unexpected pain or swelling, itching or hives, chest pain, chest tightness, trouble breathing, changes in how much or how often you urinate, red or pink urine, numbness or tingling in your feet, or unusual muscle weakness. When Apixaban/Eliquis is taken with other medicines, they can affect how it works. Taking other medications such as aspirin, blood thinners, nonsteroidal anti-inflammatories, and medications that treat depression can increase your risk of bleeding. It is very important to tell your health care provider about all of the other medicines, including over-the-counter medications, herbs, and vitamins you are taking. DO NOT start, stop, or change the dosage of any medicine, including over-the-counter medicines, vitamins, and herbal products without your doctor’s approval. Any products containing aspirin or are nonsteroidal anti-inflammatories lessen the blood’s ability to form clots and add to the effect of Apixaban/Eliquis. Never take aspirin or medicines that contain aspirin without speaking to your doctor.

## 2021-06-29 NOTE — DISCHARGE NOTE PROVIDER - NSDCCPCAREPLAN_GEN_ALL_CORE_FT
PRINCIPAL DISCHARGE DIAGNOSIS  Diagnosis: Osteoarthritis of left knee  Assessment and Plan of Treatment: Physical Therapy/Occupational Therapy for: ambulation, transfers, stairs, ADL's (activities of daily living), and range of motion exercises  -Activity  • Weight Bearing as tolerated with rolling walker.  • Take short, frequent walks increasing the distance that you walk each day as tolerated.  • Change your position every hour to decrease pain and stiffness.  • Continue the exercises taught to you by your physical therapist.  • No driving until cleared by the doctor.  • No tub baths, hot tubs, or swimming pools until instructed by your doctor.  • Do not squat down on the floor.  • Do not kneel or twist your knee.  • Range of Motion Goals: Flexion= 120 degrees, Extension = 0 degrees  Keep incision clean. DO NOT APPLY ANYTHING to incision site (salves/ointments/creams). Do not scrub incision site. Pat dry after shower.  Suture/Prineo removal 2 weeks after surgery at Surgeon's office.  Ice packs to knee for 20 minutes alternating with 20 minutes off, and post-PT  Follow up with your primary care physician within 2-3 weeks of discharge home  Eliquis for 12 days (last dose 7/11) then follow with Ecotrin 325mg BID x 4 wks more( if no ASA contraindications).  Notify Surgeon for signs of knee cellulitis, severe knee pain, fever, fall/injury to operative knee/leg.

## 2021-06-29 NOTE — PHYSICAL THERAPY INITIAL EVALUATION ADULT - ADDITIONAL COMMENTS
Pt lives at home with . As per pt, her  is disabled. Pt has 4 steps to enter with handrail and 3 steps inside. Pt was independent PTA without AD.

## 2021-06-29 NOTE — DISCHARGE NOTE NURSING/CASE MANAGEMENT/SOCIAL WORK - NSSCTYPOFSERV_GEN_ALL_CORE
Physical Therapist to visit the day after hospital discharge; Registered Nurse to follow if there is a need. Please contact the home care agency at the above phone number if you have not heard from them by 12 noon on the day after your hospital discharge.

## 2021-06-29 NOTE — BRIEF OPERATIVE NOTE - NSICDXBRIEFPOSTOP_GEN_ALL_CORE_FT
POST-OP DIAGNOSIS:  Primary localized osteoarthritis of left knee 29-Jun-2021 11:12:14  Lebron Diamond

## 2021-06-29 NOTE — CONSULT NOTE ADULT - ASSESSMENT
Aftercare following left TKR 6/29/21    pain meds as per anesthesia.  PT/OT.  DVT prophylaxis.  DVT ppx: [ ]ASA81 [ ] UNR442 [ ] Lovenox [ ] Coumadin   [ ] Eliquis [  ] Heparin  Dispo:     Home [ ]     Acute Rehab [ ]     [facility] [ ]     TBD [x ]    Morbid obesity/ENOC     Aftercare following left TKR 6/29/21    pain meds oxycodone and dilaudid. Monitor for respiratory depression and lethargy.  PT/OT.  DVT prophylaxis.  DVT ppx: [ ]ASA81 [ ] YKJ830 [ ] Lovenox [ ] Coumadin   [ ] Eliquis [  ] Heparin  Dispo:     Home [ ]     Acute Rehab [ ]     [facility] [ ]     TBD [x ]    Morbid obesity/ENOC no cpap    OA     Plan of care was discuss with patient, all questions were answered, seems understand and in agreement.

## 2021-06-29 NOTE — PHYSICAL THERAPY INITIAL EVALUATION ADULT - GAIT TRAINING, PT EVAL
Goals 3-5 sessions. Pt will ambulate 150 feet with RW independently. Pt will negotiate 4 steps with supervision.

## 2021-06-29 NOTE — DISCHARGE NOTE PROVIDER - NSDCMRMEDTOKEN_GEN_ALL_CORE_FT
Advil 200 mg oral tablet: 1 tab(s) orally every 6 hours, As Needed  olmesartan medoxomil:    acetaminophen 500 mg oral tablet: 2 tab(s) orally every 8 hours  Aspirin Enteric Coated 81 mg oral delayed release tablet: Start 1 tab orally every 12 hours for 28 days, once Eliquis is completed.  celecoxib 200 mg oral capsule: 1 cap orally every 12 hours.  Eliquis 2.5 mg oral tablet: 1 tab orally every 12 hours. Change to Aspirin 81mg every 12 hours for 28 days, once Eliquis is completed.   olmesartan medoxomil:   omeprazole 20 mg oral delayed release capsule: 1 cap orally once a day   oxyCODONE 5 mg oral tablet: 1 or 2 tab(s) orally every 4 hours, As Needed -moderate Pain to severe pain (4 - 6) MDD:6    [place] [facility] ref#[id number]  polyethylene glycol 3350 oral powder for reconstitution: 17 gram(s) orally once a day (at bedtime)  senna oral tablet: 2 tab(s) orally once a day (at bedtime)

## 2021-06-29 NOTE — PHYSICAL THERAPY INITIAL EVALUATION ADULT - MANUAL MUSCLE TESTING RESULTS, REHAB EVAL
bilateral UE 5/5, right LE 5/5, Left hip 5/5, left knee 3/5, left ankle 3+/5/grossly assessed due to

## 2021-06-29 NOTE — PHYSICAL THERAPY INITIAL EVALUATION ADULT - RANGE OF MOTION EXAMINATION, REHAB EVAL
Left hip and ankle WFL, left knee limited ~65 degrees/bilateral upper extremity ROM was WFL (within functional limits)/Right LE ROM was WFL (within functional limits)

## 2021-06-29 NOTE — DISCHARGE NOTE NURSING/CASE MANAGEMENT/SOCIAL WORK - PATIENT PORTAL LINK FT
You can access the FollowMyHealth Patient Portal offered by [facility] by registering at the following website: [email or web address]. By joining [facility]’s FollowMyHealth portal, you will also be able to view your health information using other applications (apps) compatible with our system.

## 2021-06-29 NOTE — DISCHARGE NOTE PROVIDER - CARE PROVIDER_API CALL
Brodie Patel)  Orthopaedic Surgery  [address], [address]  [place] [address]  Phone: [phone]  Fax: [phone]  Scheduled Appointment: 07/14/2021 10:45 AM

## 2021-06-29 NOTE — BRIEF OPERATIVE NOTE - NSICDXBRIEFPREOP_GEN_ALL_CORE_FT
PRE-OP DIAGNOSIS:  Primary localized osteoarthritis of left knee 29-Jun-2021 11:12:04  Lebron Diamond

## 2021-06-29 NOTE — DISCHARGE NOTE PROVIDER - HOSPITAL COURSE
This patient is a 71y.o.  female with severe osteoarthritis of the left knee.  After admission on 6/29 and receiving pre-operative parenteral prophylactic antibiotics, the patient  underwent an  uncomplicated left total knee arthroplasty by Dr. Patel.    A medical consultation from the Hospitalist service was obtained for post-operative medical co-management.   Typical Physical & occupational therapy modalities post total knee arthroplasty were performed including ambulation training, range of motion, ADL's, and transfers.  Eliquis was given for DVT prophylaxis.  The patient had a clean appearing surgical incision with no sign of surgical site infections and had a stable neuro / vascular exam of the operated extremity.     Discharge and Orthopedic Care instructions were delineated in the Discharge Plan and reviewed with the patient.   All medications were delineated in the medication reconciliation tool and key points were reviewed with the patient.   The patient was deemed stable from an Orthopedic & medical standpoint for discharge today.   She will  follow up with Dr. Patel for further orthopedic care upon ( discharge from the rehab facility ) or (completion of Home care PT). This patient is a 71y.o.  female with severe osteoarthritis of the left knee.  After admission on 6/29 and receiving pre-operative parenteral prophylactic antibiotics, the patient  underwent an  uncomplicated left total knee arthroplasty by Dr. Patel.    A medical consultation from the Hospitalist service was obtained for post-operative medical co-management.   Typical Physical & occupational therapy modalities post total knee arthroplasty were performed including ambulation training, range of motion, ADL's, and transfers.  Eliquis was given for DVT prophylaxis.  The patient had a clean appearing surgical incision with no sign of surgical site infections and had a stable neuro / vascular exam of the operated extremity.     Discharge and Orthopedic Care instructions were delineated in the Discharge Plan and reviewed with the patient.   All medications were delineated in the medication reconciliation tool and key points were reviewed with the patient.   The patient was deemed stable from an Orthopedic & medical standpoint for discharge today.   She will  follow up with Dr. Patel for further orthopedic care upon on completion of Home care PT.

## 2021-06-29 NOTE — DISCHARGE NOTE NURSING/CASE MANAGEMENT/SOCIAL WORK - NSSCNAMETXT_GEN_ALL_CORE
[facility] (formerly [facility])   [address] [place] [address]

## 2021-06-29 NOTE — CONSULT NOTE ADULT - SUBJECTIVE AND OBJECTIVE BOX
Patient is a 71y old  Female who presents with a chief complaint of   72 y/o female with h/o arthritis with left knee pain for long Time spent -  Discuss with PMD-,  Failed conservative therapy. Takes advil as needed with some relief.   HPI:  Patient is seen and examined.    PAST MEDICAL & SURGICAL HISTORY:  HTN (Hypertension)  no meds    Trace Cataracts    Obstructive Sleep Apnea  no device    Uterine Cancer  2011, surgery    Diabetes  resolved after bariatric surgery    Morbid obesity with body mass index (BMI) of 40.0 to 44.9 in adult    COVID-19 vaccine series completed  4/3/21    Hand Pain  right hand cyst removed on palm, 2010    S/P D&amp;C  1980    S/P Cholecystectomy  1999    Incisional Hernia  2003    H/O total hysterectomy  2011    H/O bariatric surgery  feb2015. lost 70 lbs sleeve gastrectomy      MEDICATIONS  (STANDING):  ceFAZolin   IVPB 2000 milliGRAM(s) IV Intermittent every 8 hours  lactated ringers. 1000 milliLiter(s) (75 mL/Hr) IV Continuous <Continuous>    MEDICATIONS  (PRN):  HYDROmorphone  Injectable 0.5 milliGRAM(s) IV Push every 10 minutes PRN Moderate Pain (4 - 6)  ondansetron Injectable 4 milliGRAM(s) IV Push once PRN Nausea and/or Vomiting      Allergies    Effexor (Other)    Intolerances    SOCIAL HISTORY:  Smoker:  YES / NO        PACK YEARS:                         WHEN QUIT?  ETOH use:  YES / NO               FREQUENCY / QUANTITY:  Ilicit Drug use:  YES / NO  Occupation:  Assisted device use (Cane / Walker):  Live with:      FAMILY HISTORY:  Family history of atrial fibrillation        Vital Signs Last 24 Hrs  T(C): 36.7 (29 Jun 2021 11:11), Max: 36.7 (29 Jun 2021 11:11)  T(F): 98.1 (29 Jun 2021 11:11), Max: 98.1 (29 Jun 2021 11:11)  HR: 77 (29 Jun 2021 11:56) (71 - 85)  BP: 134/83 (29 Jun 2021 11:56) (101/81 - 165/86)  BP(mean): --  RR: 18 (29 Jun 2021 11:56) (14 - 19)  SpO2: 99% (29 Jun 2021 11:56) (96% - 100%)             Patient is a 71y old  Female who presents with a chief complaint of   72 y/o female with h/o arthritis with left knee pain for long Time spent -  Discuss with PMD-,  Failed conservative therapy. Takes advil as needed with some relief.   HPI:  Patient is seen and examined.  Pain is controlled.    PAST MEDICAL & SURGICAL HISTORY:  HTN (Hypertension)  no meds    Trace Cataracts    Obstructive Sleep Apnea  no device    Uterine Cancer  2011, surgery    Diabetes  resolved after bariatric surgery    Morbid obesity with body mass index (BMI) of 40.0 to 44.9 in adult    COVID-19 vaccine series completed  4/3/21    Hand Pain  right hand cyst removed on palm, 2010    S/P D&amp;C  1980    S/P Cholecystectomy  1999    Incisional Hernia  2003    H/O total hysterectomy  2011    H/O bariatric surgery  feb2015. lost 70 lbs sleeve gastrectomy      MEDICATIONS  (STANDING):  ceFAZolin   IVPB 2000 milliGRAM(s) IV Intermittent every 8 hours  lactated ringers. 1000 milliLiter(s) (75 mL/Hr) IV Continuous <Continuous>    MEDICATIONS  (PRN):  HYDROmorphone  Injectable 0.5 milliGRAM(s) IV Push every 10 minutes PRN Moderate Pain (4 - 6)  ondansetron Injectable 4 milliGRAM(s) IV Push once PRN Nausea and/or Vomiting      Allergies    Effexor (Other)    Intolerances    SOCIAL HISTORY:  Smoker:   NO        PACK YEARS:                         WHEN QUIT?  ETOH use:  NO               FREQUENCY / QUANTITY:  Ilicit Drug use:   NO  Occupation:        FAMILY HISTORY:  Family history of atrial fibrillation        Vital Signs Last 24 Hrs  T(C): 36.7 (29 Jun 2021 11:11), Max: 36.7 (29 Jun 2021 11:11)  T(F): 98.1 (29 Jun 2021 11:11), Max: 98.1 (29 Jun 2021 11:11)  HR: 77 (29 Jun 2021 11:56) (71 - 85)  BP: 134/83 (29 Jun 2021 11:56) (101/81 - 165/86)  BP(mean): --  RR: 18 (29 Jun 2021 11:56) (14 - 19)  SpO2: 99% (29 Jun 2021 11:56) (96% - 100%)

## 2021-06-30 VITALS
HEART RATE: 67 BPM | OXYGEN SATURATION: 97 % | TEMPERATURE: 98 F | DIASTOLIC BLOOD PRESSURE: 72 MMHG | SYSTOLIC BLOOD PRESSURE: 122 MMHG | RESPIRATION RATE: 18 BRPM

## 2021-06-30 LAB
ANION GAP SERPL CALC-SCNC: 8 MMOL/L — SIGNIFICANT CHANGE UP (ref 5–17)
BUN SERPL-MCNC: 10 MG/DL — SIGNIFICANT CHANGE UP (ref 7–23)
CALCIUM SERPL-MCNC: 8.7 MG/DL — SIGNIFICANT CHANGE UP (ref 8.4–10.5)
CHLORIDE SERPL-SCNC: 102 MMOL/L — SIGNIFICANT CHANGE UP (ref 96–108)
CO2 SERPL-SCNC: 25 MMOL/L — SIGNIFICANT CHANGE UP (ref 22–31)
CREAT SERPL-MCNC: 0.62 MG/DL — SIGNIFICANT CHANGE UP (ref 0.5–1.3)
GLUCOSE SERPL-MCNC: 161 MG/DL — HIGH (ref 70–99)
HCT VFR BLD CALC: 30.9 % — LOW (ref 34.5–45)
HGB BLD-MCNC: 10.4 G/DL — LOW (ref 11.5–15.5)
MCHC RBC-ENTMCNC: 31.8 PG — SIGNIFICANT CHANGE UP (ref 27–34)
MCHC RBC-ENTMCNC: 33.7 GM/DL — SIGNIFICANT CHANGE UP (ref 32–36)
MCV RBC AUTO: 94.5 FL — SIGNIFICANT CHANGE UP (ref 80–100)
NRBC # BLD: 0 /100 WBCS — SIGNIFICANT CHANGE UP (ref 0–0)
PLATELET # BLD AUTO: 189 K/UL — SIGNIFICANT CHANGE UP (ref 150–400)
POTASSIUM SERPL-MCNC: 4.3 MMOL/L — SIGNIFICANT CHANGE UP (ref 3.5–5.3)
POTASSIUM SERPL-SCNC: 4.3 MMOL/L — SIGNIFICANT CHANGE UP (ref 3.5–5.3)
RBC # BLD: 3.27 M/UL — LOW (ref 3.8–5.2)
RBC # FLD: 12.9 % — SIGNIFICANT CHANGE UP (ref 10.3–14.5)
SODIUM SERPL-SCNC: 135 MMOL/L — SIGNIFICANT CHANGE UP (ref 135–145)
WBC # BLD: 10.88 K/UL — HIGH (ref 3.8–10.5)
WBC # FLD AUTO: 10.88 K/UL — HIGH (ref 3.8–10.5)

## 2021-06-30 PROCEDURE — 85014 HEMATOCRIT: CPT

## 2021-06-30 PROCEDURE — 97530 THERAPEUTIC ACTIVITIES: CPT

## 2021-06-30 PROCEDURE — C1776: CPT

## 2021-06-30 PROCEDURE — C1713: CPT

## 2021-06-30 PROCEDURE — 97110 THERAPEUTIC EXERCISES: CPT

## 2021-06-30 PROCEDURE — 97165 OT EVAL LOW COMPLEX 30 MIN: CPT

## 2021-06-30 PROCEDURE — 80048 BASIC METABOLIC PNL TOTAL CA: CPT

## 2021-06-30 PROCEDURE — 73560 X-RAY EXAM OF KNEE 1 OR 2: CPT

## 2021-06-30 PROCEDURE — 97535 SELF CARE MNGMENT TRAINING: CPT

## 2021-06-30 PROCEDURE — 88311 DECALCIFY TISSUE: CPT

## 2021-06-30 PROCEDURE — 94664 DEMO&/EVAL PT USE INHALER: CPT

## 2021-06-30 PROCEDURE — 88305 TISSUE EXAM BY PATHOLOGIST: CPT

## 2021-06-30 PROCEDURE — 99232 SBSQ HOSP IP/OBS MODERATE 35: CPT

## 2021-06-30 PROCEDURE — 36415 COLL VENOUS BLD VENIPUNCTURE: CPT

## 2021-06-30 PROCEDURE — 97116 GAIT TRAINING THERAPY: CPT

## 2021-06-30 PROCEDURE — 85027 COMPLETE CBC AUTOMATED: CPT

## 2021-06-30 PROCEDURE — 85018 HEMOGLOBIN: CPT

## 2021-06-30 PROCEDURE — 97161 PT EVAL LOW COMPLEX 20 MIN: CPT

## 2021-06-30 RX ORDER — OXYCODONE HYDROCHLORIDE 5 MG/1
1 TABLET ORAL
Qty: 42 | Refills: 0
Start: 2021-06-30 | End: 2021-07-06

## 2021-06-30 RX ORDER — IBUPROFEN 200 MG
1 TABLET ORAL
Qty: 0 | Refills: 0 | DISCHARGE

## 2021-06-30 RX ADMIN — Medication 100 MILLIGRAM(S): at 00:45

## 2021-06-30 RX ADMIN — Medication 1000 MILLIGRAM(S): at 11:41

## 2021-06-30 RX ADMIN — OXYCODONE HYDROCHLORIDE 10 MILLIGRAM(S): 5 TABLET ORAL at 15:10

## 2021-06-30 RX ADMIN — PANTOPRAZOLE SODIUM 40 MILLIGRAM(S): 20 TABLET, DELAYED RELEASE ORAL at 05:15

## 2021-06-30 RX ADMIN — APIXABAN 2.5 MILLIGRAM(S): 2.5 TABLET, FILM COATED ORAL at 08:37

## 2021-06-30 RX ADMIN — CELECOXIB 200 MILLIGRAM(S): 200 CAPSULE ORAL at 08:37

## 2021-06-30 RX ADMIN — Medication 1000 MILLIGRAM(S): at 11:39

## 2021-06-30 RX ADMIN — OXYCODONE HYDROCHLORIDE 10 MILLIGRAM(S): 5 TABLET ORAL at 08:37

## 2021-06-30 RX ADMIN — Medication 400 MILLIGRAM(S): at 05:15

## 2021-06-30 RX ADMIN — OXYCODONE HYDROCHLORIDE 10 MILLIGRAM(S): 5 TABLET ORAL at 09:10

## 2021-06-30 RX ADMIN — OXYCODONE HYDROCHLORIDE 10 MILLIGRAM(S): 5 TABLET ORAL at 12:20

## 2021-06-30 RX ADMIN — CELECOXIB 200 MILLIGRAM(S): 200 CAPSULE ORAL at 08:38

## 2021-06-30 RX ADMIN — OXYCODONE HYDROCHLORIDE 10 MILLIGRAM(S): 5 TABLET ORAL at 11:40

## 2021-06-30 RX ADMIN — OXYCODONE HYDROCHLORIDE 10 MILLIGRAM(S): 5 TABLET ORAL at 14:32

## 2021-06-30 NOTE — PROGRESS NOTE ADULT - SUBJECTIVE AND OBJECTIVE BOX
Orthopedic P.A.- POD# 1 - s/p Left TKR    Patient alert and comfortable in bed with oral meds for pain control.  Denies knee pain or nausea.    Vital Signs Last 24 Hrs  T(C): 36.5 (30 Jun 2021 07:26), Max: 36.7 (29 Jun 2021 11:11)  T(F): 97.7 (30 Jun 2021 07:26), Max: 98.1 (29 Jun 2021 11:11)  HR: 66 (30 Jun 2021 07:26) (50 - 85)  BP: 106/69 (30 Jun 2021 07:26) (101/81 - 145/71)  BP(mean): --  RR: 18 (30 Jun 2021 07:26) (14 - 22)  SpO2: 96% (30 Jun 2021 07:26) (94% - 100%)         I&O's Detail    29 Jun 2021 07:01  -  30 Jun 2021 07:00  --------------------------------------------------------  IN:    IV PiggyBack: 400 mL    Lactated Ringers: 1500 mL    Lactated Ringers: 1600 mL    Oral Fluid: 480 mL    Sodium Chloride 0.9% Bolus: 1000 mL  Total IN: 4980 mL    OUT:    Estimated Blood Loss (mL): 150 mL in OR    Post-Void Residual per Intermittent Catheterization (mL): 600 mL and voided on own    Voided (mL): 1800 mL  Total OUT: 2550 mL    Total NET: 2430 mL                     Labs:                                                                   10.4<L>  10.88<H> )-----------( 189      ( 30 Jun 2021 07:24 )             30.9<L>  30 Jun 2021 07:24                                30 Jun 2021 07:24    135    |  102    |  10     ----------------------------<  161<H>  4.3     |  25     |  0.62     Ca    8.7        30 Jun 2021 07:24        MEDICATIONS:acetaminophen   Tablet .. 1000 milliGRAM(s) Oral every 8 hours  apixaban 2.5 milliGRAM(s) Oral every 12 hours  celecoxib 200 milliGRAM(s) Oral every 12 hours  HYDROmorphone  Injectable 0.5 milliGRAM(s) IV Push every 3 hours PRN  lactated ringers. 1000 milliLiter(s) IV Continuous <Continuous>  magnesium hydroxide Suspension 30 milliLiter(s) Oral daily PRN  oxyCODONE    IR 5 milliGRAM(s) Oral every 3 hours PRN  oxyCODONE    IR 10 milliGRAM(s) Oral every 3 hours PRN  pantoprazole    Tablet 40 milliGRAM(s) Oral before breakfast  polyethylene glycol 3350 17 Gram(s) Oral at bedtime  senna 2 Tablet(s) Oral at bedtime    Anticoagulation:  apixaban 2.5 milliGRAM(s) Oral every 12 hours      Antibiotics: complete      Pain medications:   acetaminophen   Tablet .. 1000 milliGRAM(s) Oral every 8 hours  celecoxib 200 milliGRAM(s) Oral every 12 hours  HYDROmorphone  Injectable 0.5 milliGRAM(s) IV Push every 3 hours PRN  oxyCODONE    IR 5 milliGRAM(s) Oral every 3 hours PRN  oxyCODONE    IR 10 milliGRAM(s) Oral every 3 hours PRN                                      Physical Exam:  Left knee- Primary surgical bandage removed and sterile bandage placed over dry and intact sutured surgical incision.  Neurovascular grossly intact LE's.  PAS on LE's.  Calves soft and non-tender.                                                                                                                                                          A/P:  Orthopedically stable.  -Continue pain management with above plan.  -DVT prophylaxis with 12 days of Eliquis starting today and then 28 additional days of Ecotrin 81mg po every 12 hours.  -Labs OK today  -Increase ambulation and ROM left knee with PT/OT and for clearance for safe ambulation and stairs  -Dr. Cunha for medical clearance for discharge home today with home care services.  -Further plan as per attendings.               
                                                                               ORTHOPEDIC ATTENDING PROGRESS NOTE  GAB WRIGHT      71y Female                                                                                                                               POD #    1    STATUS POST:               Pre-Op Dx: Primary localized osteoarthritis of left knee      Post-Op Dx:  Primary localized osteoarthritis of left knee      Procedure: TKR (total knee replacement)  Left                                                  Pain (0-10):  Pt reports  Current Pain Management:  [ ] PCA   [x ] Po Analgesics [ ] IM /IV Anagesics     T(F): 97.7  HR: 66  BP: 106/69  RR: 18  SpO2: 96%                         10.4   10.88 )-----------( 189      ( 30 Jun 2021 07:24 )             30.9         06-30    135  |  102  |  10  ----------------------------<  161<H>  4.3   |  25  |  0.62    Ca    8.7      30 Jun 2021 07:24      Physical Exam :    -  Dressing C/D/I.   -   Distal Neurvascular status intact grossly.   -   Warm well perfused; capillary refill <3 seconds   -   (+)EHL/FHL   -   (+) Sensation to light touch  -   (-) Calf tenderness Bilaterally    A/P: 71y Female s/p TKR (total knee replacement)  Left       -   Ortho Stable  -   Pain control   -   Medicine to follow  -   DVT ppx:     [ ]SCDs     [x ] ASA     [ ] Eliquis     [ ] Lovenox  -   Weight bearing status:  WBAT [x ]        PWB    [ ]     TTWB  [ ]      NWB  [ ]   -  Dispo:     Home x[x ]     Acute Rehab [ ]     FREDI [ ]     TBD [ ]
Patient is 71y y/o Female s/p LEFT TKA POD#0      Patient seen and examined at bedside.   Pt tolerated procedure well without any intra-op complications.    Pain is controlled. Worked with PT.  Denies CP/SOB/Dizziness/N/V/D/HA. No orthopedic complaints.        Vital Signs Last 24 Hrs  T(C): 36.5 (29 Jun 2021 14:15), Max: 36.7 (29 Jun 2021 11:11)  T(F): 97.7 (29 Jun 2021 14:15), Max: 98.1 (29 Jun 2021 11:11)  HR: 78 (29 Jun 2021 14:15) (71 - 85)  BP: 129/57 (29 Jun 2021 14:15) (101/81 - 165/86)  BP(mean): --  RR: 18 (29 Jun 2021 14:15) (14 - 22)  SpO2: 98% (29 Jun 2021 14:15) (94% - 100%)        PHYSICAL EXAM:  General: A&Ox3 NAD  LLE: Dressing C/D/I with ACE wrap in place. Motor intact + EHL/FHL/TA/GS.  Sensation is grossly intact.  Extremity warm, compartments soft, compressible. No calf tenderness. DP 2+   RLE: Motor intact +EHL/FHL/TA/GS. Sensation is grossly intact. Extremity warm, compartments soft, compressible. No calf tenderness. DP2+          A/P: Patient is a 71y y/o Female s/p LEFT TKA, POD #0    * Pain control  * Incentive spirometry  * DVT ppx: SCDs and Eliquis 2.5 BID  * F/U AM Labs  * PT/OT  * WBAT  * Antibiotic post op  * Discharge planning      
Subjective: Patient seen and examined. No overnight events.     MEDICATIONS  (STANDING):  acetaminophen   Tablet .. 1000 milliGRAM(s) Oral every 8 hours  apixaban 2.5 milliGRAM(s) Oral every 12 hours  celecoxib 200 milliGRAM(s) Oral every 12 hours  lactated ringers. 1000 milliLiter(s) (100 mL/Hr) IV Continuous <Continuous>  pantoprazole    Tablet 40 milliGRAM(s) Oral before breakfast  polyethylene glycol 3350 17 Gram(s) Oral at bedtime  senna 2 Tablet(s) Oral at bedtime    MEDICATIONS  (PRN):  HYDROmorphone  Injectable 0.5 milliGRAM(s) IV Push every 3 hours PRN breakthrough pain  magnesium hydroxide Suspension 30 milliLiter(s) Oral daily PRN Constipation  oxyCODONE    IR 5 milliGRAM(s) Oral every 3 hours PRN Moderate Pain (4 - 6)  oxyCODONE    IR 10 milliGRAM(s) Oral every 3 hours PRN Severe Pain (7 - 10)      Allergies    Effexor (Other)    Intolerances        Vital Signs Last 24 Hrs  T(C): 36.5 (30 Jun 2021 07:26), Max: 36.7 (29 Jun 2021 11:11)  T(F): 97.7 (30 Jun 2021 07:26), Max: 98.1 (29 Jun 2021 11:11)  HR: 66 (30 Jun 2021 07:26) (50 - 85)  BP: 106/69 (30 Jun 2021 07:26) (101/81 - 145/71)  BP(mean): --  RR: 18 (30 Jun 2021 07:26) (14 - 22)  SpO2: 96% (30 Jun 2021 07:26) (94% - 100%)    PHYSICAL EXAM:  GENERAL: NAD, well-groomed, well-developed  HEAD:  Atraumatic, Normocephalic  ENMT: Moist mucous membranes,   NECK: Supple, No JVD, Normal thyroid  NERVOUS SYSTEM:  All 4 extremities mobile, no gross sensory deficits.   CHEST/LUNG: Clear to auscultation bilaterally; No rales, rhonchi, wheezing, or rubs  HEART: Regular rate and rhythm; No murmurs, rubs, or gallops  ABDOMEN: Soft, Nontender, Nondistended; Bowel sounds present  EXTREMITIES:  2+ Peripheral Pulses, No clubbing, cyanosis, or edema      LABS:                        10.4   10.88 )-----------( 189      ( 30 Jun 2021 07:24 )             30.9     30 Jun 2021 07:24    135    |  102    |  10     ----------------------------<  161    4.3     |  25     |  0.62     Ca    8.7        30 Jun 2021 07:24          CAPILLARY BLOOD GLUCOSE          RADIOLOGY & ADDITIONAL TESTS:    Imaging Personally Reviewed:  [ ] YES     Consultant(s) Notes Reviewed:      Care Discussed with Consultants/Other Providers:    Advanced Directives: [ ] DNR  [ ] No feeding tube  [ ] MOLST in chart  [ ] MOLST completed today  [ ] Unknown

## 2021-06-30 NOTE — PROGRESS NOTE ADULT - ASSESSMENT
Aftercare following left TKR 6/29/21    pain meds oxycodone and dilaudid. Monitor for respiratory depression and lethargy.  PT/OT.  DVT prophylaxis.  DVT ppx: [ ]ASA81 [ ] [id number] [ ] Lovenox [ ] Coumadin   [ ] Eliquis [  ] Heparin  Dispo:     Home [ ]     Acute Rehab [ ]     FREDI [ ]     TBD [x ]    Morbid obesity/ENOC no cpap    OA     Patient medically optimized for discharge home if cleared by PT and Ortho.

## 2021-07-14 ENCOUNTER — APPOINTMENT (OUTPATIENT)
Dept: ORTHOPEDIC SURGERY | Facility: CLINIC | Age: 71
End: 2021-07-14
Payer: COMMERCIAL

## 2021-07-14 PROBLEM — Z92.29 PERSONAL HISTORY OF OTHER DRUG THERAPY: Chronic | Status: ACTIVE | Noted: 2021-06-15

## 2021-07-14 PROCEDURE — 73560 X-RAY EXAM OF KNEE 1 OR 2: CPT | Mod: LT

## 2021-07-14 PROCEDURE — 99024 POSTOP FOLLOW-UP VISIT: CPT

## 2021-07-14 NOTE — ADDENDUM
[FreeTextEntry1] : I, Gianni Rivera, acted solely as a scribe for Dr. Brodie Patel on this date 07/14/2021.\par All medical record entries made by the Scribe were at my, Dr. Brodie Patel, direction and personally dictated by me on 07/14/2021. I have reviewed the chart and agree that the record accurately reflects my personal performance of the history, physical exam, assessment and plan. I have also personally directed, reviewed, and agreed with the chart.

## 2021-07-14 NOTE — DISCUSSION/SUMMARY
[de-identified] : I discussed the underlying pathophysiology of the patient's condition in great detail with the patient. I went over the patient's x-rays with them in great detail. We discussed the use of ice, Tylenol and anti-inflammatories to relieve pain. At-home strengthening, and stretching exercises were discussed and demonstrated with the patient. I encouraged frequent elevation, thigh tightening and ankle pumping. Proper cane walking protocol was discussed and demonstrated with the patient. I showed the patient how to massage her scar with hand cream in order to desensitize the area, and advised her to do it daily after the steri strips fall off. At this time, she will start a course of outpatient physical therapy for strengthening and flexibility. A prescription for physical therapy was provided. All of her questions were answered. She understands and consents to the plan. \par \par FU 1 month.\par after undergoing outpatient PT for her left knee.

## 2021-07-14 NOTE — HISTORY OF PRESENT ILLNESS
[de-identified] : 71 year old female patient returns for the 1st postoperative visit, 2 weeks s/p left TKR done on 7/6/21. The patient is recovering at home. She reports mild postoperative pain. She is receiving postoperative home PT. She was on Eliquis and is now on baby aspirin for DVT prophylaxis. The patient presents ambulating with a walker. Of note, Her most recent right knee Monovisc injection was in March 2020. Her BMI is 44.4, weight = 220 pounds, Height = 4 feet, 11 inches). Patient is fully COVID vaccinated as of February 2021.

## 2021-07-14 NOTE — REASON FOR VISIT
[Post Operative Visit] : a post operative visit for [Aftercare Following Surgery] : aftercare following surgery [FreeTextEntry2] : s/p left TKR done on 6/29/21

## 2021-08-19 ENCOUNTER — APPOINTMENT (OUTPATIENT)
Dept: ORTHOPEDIC SURGERY | Facility: CLINIC | Age: 71
End: 2021-08-19
Payer: COMMERCIAL

## 2021-08-19 PROCEDURE — 73560 X-RAY EXAM OF KNEE 1 OR 2: CPT | Mod: LT

## 2021-08-19 PROCEDURE — 99024 POSTOP FOLLOW-UP VISIT: CPT

## 2021-08-19 NOTE — DISCUSSION/SUMMARY
[de-identified] : I discussed the underlying pathophysiology of the patient's condition in great detail with the patient. I went over the patient's x-rays with them in great detail. I showed the patient how to massage her scar with hand cream in order to desensitize the area, and advised her to do it daily. I encouraged her to continue icing her knee. I recommended activities such as riding a stationary bike on low tension with the seat as high as it can go. I am recommending the patient continue going to physical therapy 2x/week to obtain increases in their strength and mobility. A prescription for PT was provided. All of her questions were answered. She understands and consents to the plan. She is cleared to return to work on Monday August 30th, 3021.\par \par FU 6-8 weeks.\par after continuing PT for her left knee.

## 2021-08-19 NOTE — ADDENDUM
[FreeTextEntry1] : I, Gianni Rivera, acted solely as a scribe for Dr. Brodie Patel on this date 08/19/2021.\par All medical record entries made by the Scribe were at my, Dr. Brodie Patel, direction and personally dictated by me on 08/19/2021. I have reviewed the chart and agree that the record accurately reflects my personal performance of the history, physical exam, assessment and plan. I have also personally directed, reviewed, and agreed with the chart.

## 2021-08-19 NOTE — PHYSICAL EXAM
[LE] : Sensory: Intact in bilateral lower extremities [de-identified] : Constitutional\par o Appearance : well-nourished, well developed, alert, in no acute distress \par Head and Face\par o Head :\par ¦ Inspection : atraumatic, normocephalic\par o Face :\par ¦ Inspection : no visible rash or discoloration\par Respiratory\par o Respiratory Effort: breathing unlabored \par Neurologic\par o Mental Status Examination :\par ¦ Orientation : alert and oriented X 3\par Psychiatric\par o Mood and Affect: mood normal, affect appropriate\par Cardiovascular\par o Observation/Palpation : - no swelling\par Lymphatic\par o Additional Nodes : No palpable lymph nodes present\par \par Right Lower Extremity\par o Buttock : no tenderness, swelling or deformities \par o Right Hip :\par    - Inspection/Palpation : no tenderness, no swelling or deformities\par    - Range of Motion : full and painless in all planes, no crepitance\par    - Stability : joint stability intact\par    - Strength : extension, flexion, adduction, abduction, internal rotation and external rotation 4+/5 \par    - Tests: Brian’s test negative\par o Right Knee :\par ¦ Inspection/Palpation : medial compartment tenderness, no swelling\par ¦ Range of Motion : 0-120 ° , no crepitance \par ¦ Stability : no valgus or varus instability present on provocative testing\par ¦ Strength : flexion and extension 5/5 \par ¦ Tests and Signs : negative Anterior Drawer, negative Lachman, negative Nick\par \par Left Lower Extremity\par o Buttock : no tenderness, swelling or deformities \par o Left Hip :\par    - Inspection/Palpation : no tenderness, no swelling or deformities\par    - Range of Motion : full and painless in all planes, no crepitance\par    - Stability : joint stability intact\par    - Strength : extension, flexion, adduction, abduction, internal rotation and external rotation 4+/5  \par    - Tests: Brian’s test negative\par \par o Left Knee :\par ¦ Inspection/Palpation : no tenderness to palpation, no swelling, incision well-healed\par ¦ Range of Motion : 0-110° easily, no crepitance, good patellofemoral glide\par ¦ Stability : no valgus or varus instability present on provocative testing\par ¦ Strength : flexion and extension 4+/5, can SLR, no extensor lag\par ¦ Tests and Signs : negative Anterior Drawer\par \par DP/PT: 2+ bilaterally \par \par Gait and Station:\par Gait: heel/toe on the left, ambulating independently, no significant extremity swelling or lymphedema, good proprioception and balance\par \par Radiology Results:\par o Left Knee : Standing AP, Lateral and skyline views of the knee were obtained and revealed excellent position of her left total knee replacement with central tracking of the patella.

## 2021-08-19 NOTE — HISTORY OF PRESENT ILLNESS
[de-identified] : 71 year old female patient returns for the 2nd postoperative visit, 7 weeks s/p left TKR done on 7/6/21. The patient is recovering at home. She reports mild postoperative pain. She is attending outpatient PT 3x/week and feels she is progressing. She presents ambulating independently. She notes that yesterday her therapist lowered the seat on her stationary bike, which caused some pain. Of note, Her most recent right knee Monovisc injection was in 03/2020. Her BMI is 44.4 (weight = 220 pounds, Height = 4 feet, 11 inches). Patient is fully COVID vaccinated as of February 2021.

## 2021-10-06 ENCOUNTER — APPOINTMENT (OUTPATIENT)
Dept: ORTHOPEDIC SURGERY | Facility: CLINIC | Age: 71
End: 2021-10-06
Payer: COMMERCIAL

## 2021-10-06 PROCEDURE — 73562 X-RAY EXAM OF KNEE 3: CPT | Mod: LT

## 2021-10-06 PROCEDURE — 99213 OFFICE O/P EST LOW 20 MIN: CPT

## 2021-10-06 NOTE — PHYSICAL EXAM
[LE] : Sensory: Intact in bilateral lower extremities [de-identified] : Constitutional\par o Appearance : well-nourished, well developed, alert, in no acute distress \par Head and Face\par o Head :\par ¦ Inspection : atraumatic, normocephalic\par o Face :\par ¦ Inspection : no visible rash or discoloration\par Respiratory\par o Respiratory Effort: breathing unlabored \par Neurologic\par o Mental Status Examination :\par ¦ Orientation : alert and oriented X 3\par Psychiatric\par o Mood and Affect: mood normal, affect appropriate\par Cardiovascular\par o Observation/Palpation : - no swelling\par Lymphatic\par o Additional Nodes : No palpable lymph nodes present\par \par Right Lower Extremity\par o Buttock : no tenderness, swelling or deformities \par o Right Hip :\par    - Inspection/Palpation : no tenderness, no swelling or deformities\par    - Range of Motion : full and painless in all planes, no crepitance\par    - Stability : joint stability intact\par    - Strength : extension, flexion, adduction, abduction, internal rotation and external rotation 4+/5 \par    - Tests: Brian’s test negative\par o Right Knee :\par ¦ Inspection/Palpation : medial compartment tenderness, no swelling\par ¦ Range of Motion : 0-120 ° , no crepitance \par ¦ Stability : no valgus or varus instability present on provocative testing\par ¦ Strength : flexion and extension 5/5 \par ¦ Tests and Signs : negative Anterior Drawer, negative Lachman, negative Nick\par \par Left Lower Extremity\par o Buttock : no tenderness, swelling or deformities \par o Left Hip :\par    - Inspection/Palpation : no tenderness, no swelling or deformities\par    - Range of Motion : full and painless in all planes, no crepitance\par    - Stability : joint stability intact\par    - Strength : extension, flexion, adduction, abduction, internal rotation and external rotation 4+/5  \par    - Tests: Brian’s test negative\par \par o Left Knee :\par ¦ Inspection/Palpation : no tenderness to palpation, no swelling, incision well-healed\par ¦ Range of Motion : 0-110°, no crepitance, good patellofemoral glide\par ¦ Stability : no valgus or varus instability present on provocative testing\par ¦ Strength : flexion and extension 4+/5, can SLR, no extensor lag\par ¦ Tests and Signs : negative Anterior Drawer\par \par DP/PT: 2+ bilaterally \par \par Gait and Station:\par Gait: heel/toe on the left, ambulating independently, no significant extremity swelling or lymphedema, good proprioception and balance\par \par Radiology Results:\par o Left Knee : Standing AP, Lateral and skyline views of the knee were obtained and revealed excellent position of her left total knee replacement with central tracking of the patella.

## 2021-10-06 NOTE — DISCUSSION/SUMMARY
[de-identified] : I discussed the underlying pathophysiology of the patient's condition in great detail with the patient. I went over the patient's x-rays with them in great detail. I showed the patient how to massage her scar with hand cream in order to flatten and desensitize the area, and advised her to do it daily. I am recommending the patient continue going to physical therapy to obtain increases in their strength and mobility. A prescription was provided. I advised her that at this point her therapist does not need to forcefully manipulate her knee. All of her questions were answered. She understands and consents to the plan.\par \par FU 6 weeks.\par after continuing PT for her left knee.

## 2021-10-06 NOTE — ADDENDUM
[FreeTextEntry1] : I, Gianni Rivera, acted solely as a scribe for Dr. Brodie Patel on this date 10/06/2021.\par All medical record entries made by the Scribe were at my, Dr. Brodie Patel, direction and personally dictated by me on 10/06/2021. I have reviewed the chart and agree that the record accurately reflects my personal performance of the history, physical exam, assessment and plan. I have also personally directed, reviewed, and agreed with the chart.

## 2021-11-29 ENCOUNTER — APPOINTMENT (OUTPATIENT)
Dept: ORTHOPEDIC SURGERY | Facility: CLINIC | Age: 71
End: 2021-11-29
Payer: COMMERCIAL

## 2021-11-29 PROCEDURE — 99213 OFFICE O/P EST LOW 20 MIN: CPT

## 2021-11-29 PROCEDURE — 73562 X-RAY EXAM OF KNEE 3: CPT | Mod: LT

## 2021-11-29 NOTE — DISCUSSION/SUMMARY
[de-identified] : I discussed the underlying pathophysiology of the patient's condition in great detail with the patient. I went over the patient's x-rays with them in great detail. I advised the patient that she should take prophylactic antibiotics before dental work until she is 1 year postop. A prescription for Keflex was provided and she was told to take 4 pills before her dental procedure. She will continue exercising and working with her . All of her questions were answered. She understands and consents to the plan.\par \par FU 2-3 months.

## 2021-11-29 NOTE — HISTORY OF PRESENT ILLNESS
[de-identified] : 71 year old female returns 5 months s/p left TKR done on 6/29/2021. She is doing therapy 2x/week and works with a  2x/week. She is thrilled with her progress and result. She notes intermittent numbness along the lateral aspect of the knee and pain into the shin if she walks too fast. Of note, her most recent right knee Monovisc injection was in 03/2020. Her BMI is 44.4 (weight = 220 pounds, Height = 4 feet, 11 inches). Patient had 2nd COVID vaccine 02/2021.

## 2021-11-29 NOTE — ADDENDUM
[FreeTextEntry1] : I, Gianni Rivera, acted solely as a scribe for Dr. Brodie Patel on this date 11/29/2021.\par All medical record entries made by the Scribe were at my, Dr. Brodie Patel, direction and personally dictated by me on 11/29/2021. I have reviewed the chart and agree that the record accurately reflects my personal performance of the history, physical exam, assessment and plan. I have also personally directed, reviewed, and agreed with the chart.

## 2021-11-29 NOTE — PHYSICAL EXAM
[LE] : Sensory: Intact in bilateral lower extremities [de-identified] : Constitutional\par o Appearance : well-nourished, well developed, alert, in no acute distress \par Head and Face\par o Head :\par ¦ Inspection : atraumatic, normocephalic\par o Face :\par ¦ Inspection : no visible rash or discoloration\par Respiratory\par o Respiratory Effort: breathing unlabored \par Neurologic\par o Mental Status Examination :\par ¦ Orientation : alert and oriented X 3\par Psychiatric\par o Mood and Affect: mood normal, affect appropriate\par Cardiovascular\par o Observation/Palpation : - no swelling\par Lymphatic\par o Additional Nodes : No palpable lymph nodes present\par \par Right Lower Extremity\par o Buttock : no tenderness, swelling or deformities \par o Right Hip :\par    - Inspection/Palpation : no tenderness, no swelling or deformities\par    - Range of Motion : full and painless in all planes, no crepitance\par    - Stability : joint stability intact\par    - Strength : extension, flexion, adduction, abduction, internal rotation and external rotation 4+/5 \par    - Tests: Brian’s test negative\par o Right Knee :\par ¦ Inspection/Palpation : medial compartment tenderness, no swelling\par ¦ Range of Motion : 0-120 ° , no crepitance \par ¦ Stability : no valgus or varus instability present on provocative testing\par ¦ Strength : flexion and extension 5/5 \par ¦ Tests and Signs : negative Anterior Drawer, negative Lachman, negative Nick\par \par Left Lower Extremity\par o Buttock : no tenderness, swelling or deformities \par o Left Hip :\par    - Inspection/Palpation : no tenderness, no swelling or deformities\par    - Range of Motion : full and painless in all planes, no crepitance\par    - Stability : joint stability intact\par    - Strength : extension, flexion, adduction, abduction, internal rotation and external rotation, 5/5  \par    - Tests: Brian’s test negative\par \par o Left Knee :\par ¦ Inspection/Palpation : no tenderness to palpation, no swelling, incision well-healed\par ¦ Range of Motion : 0-100°, no crepitance, good patellofemoral glide\par ¦ Stability : no valgus or varus instability present on provocative testing\par ¦ Strength : flexion and extension 5/5, can SLR, no extensor lag\par ¦ Tests and Signs : negative Anterior Drawer\par \par DP/PT: 2+ bilaterally \par \par Gait and Station:\par Gait: heel/toe on the left, ambulating independently, no significant extremity swelling or lymphedema, good proprioception and balance\par \par Radiology Results:\par o Left Knee : Standing AP, Lateral and skyline views of the knee were obtained and revealed excellent position of her left total knee replacement with central tracking of the patella.

## 2021-12-09 ENCOUNTER — NON-APPOINTMENT (OUTPATIENT)
Age: 71
End: 2021-12-09

## 2021-12-14 ENCOUNTER — APPOINTMENT (OUTPATIENT)
Dept: OBGYN | Facility: CLINIC | Age: 71
End: 2021-12-14
Payer: COMMERCIAL

## 2021-12-14 VITALS
DIASTOLIC BLOOD PRESSURE: 89 MMHG | HEART RATE: 85 BPM | BODY MASS INDEX: 43 KG/M2 | HEIGHT: 60 IN | WEIGHT: 219 LBS | SYSTOLIC BLOOD PRESSURE: 176 MMHG

## 2021-12-14 PROCEDURE — G0101: CPT

## 2021-12-14 RX ORDER — HYALURONATE SODIUM, STABILIZED 88 MG/4 ML
88 SYRINGE (ML) INTRAARTICULAR
Qty: 1 | Refills: 0 | Status: COMPLETED | COMMUNITY
Start: 2020-01-29 | End: 2021-12-14

## 2021-12-14 RX ORDER — HYALURONATE SODIUM, STABILIZED 88 MG/4 ML
88 SYRINGE (ML) INTRAARTICULAR
Qty: 1 | Refills: 0 | Status: COMPLETED | COMMUNITY
Start: 2019-10-28 | End: 2021-12-14

## 2022-02-14 ENCOUNTER — APPOINTMENT (OUTPATIENT)
Dept: ORTHOPEDIC SURGERY | Facility: CLINIC | Age: 72
End: 2022-02-14
Payer: COMMERCIAL

## 2022-02-14 PROCEDURE — 73562 X-RAY EXAM OF KNEE 3: CPT | Mod: LT

## 2022-02-14 PROCEDURE — 99213 OFFICE O/P EST LOW 20 MIN: CPT

## 2022-02-14 NOTE — DISCUSSION/SUMMARY
[de-identified] : I discussed the underlying pathophysiology of the patient's condition in great detail with the patient. I went over the patient's x-rays with them in great detail. I advised her to keep her leg strong and work on losing weight. I informed her that she should be very careful when walking outside because a fall could result in serious consequences. She will follow-up in 3 months. All of her questions were answered. She understands and consents to the plan.\par \par FU 3 months.

## 2022-02-14 NOTE — HISTORY OF PRESENT ILLNESS
[de-identified] : 71 year old female returns 7.5 months s/p left TKR done on 6/29/2021. She works with a  2x/week. She is thrilled with her progress and result. She notes intermittent numbness along the lateral aspect of the knee and pain into the shin if she walks too fast. Of note, her most recent right knee Monovisc injection was in 03/2020. Her BMI is 44.4 (weight = 220 pounds, Height = 4 feet, 11 inches). Patient had 2nd COVID vaccine 02/2021.

## 2022-02-14 NOTE — ADDENDUM
[FreeTextEntry1] : I, Gianni Rivera, acted solely as a scribe for Dr. Brodie Patel on this date 02/14/2022.\par All medical record entries made by the Scribe were at my, Dr. Brodie Patel, direction and personally dictated by me on 02/14/2022. I have reviewed the chart and agree that the record accurately reflects my personal performance of the history, physical exam, assessment and plan. I have also personally directed, reviewed, and agreed with the chart.

## 2022-02-14 NOTE — PHYSICAL EXAM
[LE] : Sensory: Intact in bilateral lower extremities [de-identified] : Constitutional\par o Appearance : well-nourished, well developed, alert, in no acute distress \par Head and Face\par o Head :\par ¦ Inspection : atraumatic, normocephalic\par o Face :\par ¦ Inspection : no visible rash or discoloration\par Respiratory\par o Respiratory Effort: breathing unlabored \par Neurologic\par o Mental Status Examination :\par ¦ Orientation : alert and oriented X 3\par Psychiatric\par o Mood and Affect: mood normal, affect appropriate\par Cardiovascular\par o Observation/Palpation : - no swelling\par Lymphatic\par o Additional Nodes : No palpable lymph nodes present\par \par Right Lower Extremity\par o Buttock : no tenderness, swelling or deformities \par o Right Hip :\par    - Inspection/Palpation : no tenderness, no swelling or deformities\par    - Range of Motion : full and painless in all planes, no crepitance\par    - Stability : joint stability intact\par    - Strength : extension, flexion, adduction, abduction, internal rotation and external rotation 4+/5 \par    - Tests: Brian’s test negative\par o Right Knee :\par ¦ Inspection/Palpation : medial compartment tenderness, no swelling\par ¦ Range of Motion : 0-120 ° , no crepitance \par ¦ Stability : no valgus or varus instability present on provocative testing\par ¦ Strength : flexion and extension 5/5 \par ¦ Tests and Signs : negative Anterior Drawer, negative Lachman, negative Nick\par \par Left Lower Extremity\par o Buttock : no tenderness, swelling or deformities \par o Left Hip :\par    - Inspection/Palpation : no tenderness, no swelling or deformities\par    - Range of Motion : full and painless in all planes, no crepitance\par    - Stability : joint stability intact\par    - Strength : extension, flexion, adduction, abduction, internal rotation and external rotation, 5/5  \par    - Tests: Brian’s test negative\par \par o Left Knee :\par ¦ Inspection/Palpation : no tenderness to palpation, no swelling, incision well-healed\par ¦ Range of Motion : 0-105°, no crepitance, good patellofemoral glide\par ¦ Stability : no valgus or varus instability present on provocative testing\par ¦ Strength : flexion and extension 5/5, can SLR, no extensor lag\par ¦ Tests and Signs : negative Anterior Drawer\par \par DP/PT: 2+ bilaterally \par \par Gait and Station:\par Gait: heel/toe on the left, ambulating independently, no significant extremity swelling or lymphedema, good proprioception and balance\par \par Radiology Results:\par o Left Knee : Standing AP, Lateral and skyline views of the knee were obtained and revealed excellent position of her left total knee replacement with central tracking of the patella.

## 2022-05-16 ENCOUNTER — APPOINTMENT (OUTPATIENT)
Dept: ORTHOPEDIC SURGERY | Facility: CLINIC | Age: 72
End: 2022-05-16
Payer: COMMERCIAL

## 2022-05-16 DIAGNOSIS — Z96.652 PRESENCE OF LEFT ARTIFICIAL KNEE JOINT: ICD-10-CM

## 2022-05-16 PROCEDURE — 99213 OFFICE O/P EST LOW 20 MIN: CPT

## 2022-05-16 PROCEDURE — 73562 X-RAY EXAM OF KNEE 3: CPT | Mod: LT

## 2022-05-16 NOTE — PHYSICAL EXAM
[LE] : Sensory: Intact in bilateral lower extremities [de-identified] : Constitutional\par o Appearance : well-nourished, well developed, alert, in no acute distress \par Head and Face\par o Head :\par ¦ Inspection : atraumatic, normocephalic\par o Face :\par ¦ Inspection : no visible rash or discoloration\par Respiratory\par o Respiratory Effort: breathing unlabored \par Neurologic\par o Mental Status Examination :\par ¦ Orientation : alert and oriented X 3\par Psychiatric\par o Mood and Affect: mood normal, affect appropriate\par Cardiovascular\par o Observation/Palpation : - no swelling\par Lymphatic\par o Additional Nodes : No palpable lymph nodes present\par \par Right Lower Extremity\par o Buttock : no tenderness, swelling or deformities \par o Right Hip :\par    - Inspection/Palpation : no tenderness, no swelling or deformities\par    - Range of Motion : full and painless in all planes, no crepitance\par    - Stability : joint stability intact\par    - Strength : extension, flexion, adduction, abduction, internal rotation and external rotation 4+/5 \par    - Tests: Brian’s test negative\par \par o Right Knee :\par ¦ Inspection/Palpation : medial compartment tenderness, no swelling\par ¦ Range of Motion : 0-120 ° , no crepitance \par ¦ Stability : no valgus or varus instability present on provocative testing\par ¦ Strength : flexion and extension 5/5 \par ¦ Tests and Signs : negative Anterior Drawer, negative Lachman, negative Nick\par \par Left Lower Extremity\par o Buttock : no tenderness, swelling or deformities \par o Left Hip :\par    - Inspection/Palpation : no tenderness, no swelling or deformities\par    - Range of Motion : full and painless in all planes, no crepitance\par    - Stability : joint stability intact\par    - Strength : extension, flexion, adduction, abduction, internal rotation and external rotation, 5/5  \par    - Tests: Brian’s test negative\par \par o Left Knee :\par ¦ Inspection/Palpation : no tenderness to palpation, no swelling, incision well-healed\par ¦ Range of Motion : 0-105°, no crepitance, good patellofemoral glide\par ¦ Stability : no valgus or varus instability present on provocative testing\par ¦ Strength : flexion and extension 5/5, can SLR, no extensor lag\par ¦ Tests and Signs : negative Anterior Drawer\par \par DP/PT: 2+ bilaterally \par \par Gait and Station:\par Gait: heel/toe on the left, ambulating independently, no significant extremity swelling or lymphedema, good proprioception and balance\par \par Radiology Results:\par o Left Knee : Standing AP, Lateral and skyline views of the knee were obtained and revealed excellent position of her left total knee replacement with central tracking of the patella.

## 2022-05-16 NOTE — DISCUSSION/SUMMARY
[de-identified] : I discussed the underlying pathophysiology of the patient's condition in great detail with the patient. I went over the patient's x-rays with them in great detail. I advised her to keep her leg strong and work on losing weight. I advised the patient that she no longer needs to take prophylactic antibiotics when undergoing any dental work. She should follow-up on a yearly basis at this point. All of her questions were answered. She understands and consents to the plan.\par \par FU in 1 year.

## 2022-05-16 NOTE — HISTORY OF PRESENT ILLNESS
[de-identified] : 72 year old female returns 10.5 months s/p left TKR done on 6/29/2021. She works with a  2x/week. She says that she retired 2 weeks ago and no longer has an excuse to skip visits. She is thrilled with her progress and result. She notes no buckling or swelling. She notes some tightness of the skin and stiffness.\par Of note, her most recent right knee Monovisc injection was in 03/2020. Her BMI is 44.4 (weight = 220 pounds, Height = 4 feet, 11 inches). Patient had 2nd COVID vaccine 02/2021.

## 2022-06-11 NOTE — CONSULT NOTE ADULT - TIME-BASED
Lab Results   Component Value Date    EGFR 65 06/11/2022    EGFR 55 06/10/2022    EGFR 56 06/09/2022    CREATININE 1 11 06/11/2022    CREATININE 1 26 06/10/2022    CREATININE 1 24 06/09/2022   Baseline appears around 1 2-1 4; creat stable at baseline for now  · Avoid hypotension  · Continues on home dose lasix 40 mg daily  · monitor BMP 50

## 2022-07-06 ENCOUNTER — NON-APPOINTMENT (OUTPATIENT)
Age: 72
End: 2022-07-06

## 2022-12-19 ENCOUNTER — NON-APPOINTMENT (OUTPATIENT)
Age: 72
End: 2022-12-19

## 2022-12-20 ENCOUNTER — APPOINTMENT (OUTPATIENT)
Dept: OBGYN | Facility: CLINIC | Age: 72
End: 2022-12-20

## 2022-12-20 VITALS
HEART RATE: 86 BPM | SYSTOLIC BLOOD PRESSURE: 107 MMHG | DIASTOLIC BLOOD PRESSURE: 76 MMHG | BODY MASS INDEX: 41.62 KG/M2 | HEIGHT: 60 IN | WEIGHT: 212 LBS

## 2022-12-20 DIAGNOSIS — C55 MALIGNANT NEOPLASM OF UTERUS, PART UNSPECIFIED: ICD-10-CM

## 2022-12-20 DIAGNOSIS — Z01.419 ENCOUNTER FOR GYNECOLOGICAL EXAMINATION (GENERAL) (ROUTINE) W/OUT ABNORMAL FINDINGS: ICD-10-CM

## 2022-12-20 PROCEDURE — 99397 PER PM REEVAL EST PAT 65+ YR: CPT

## 2022-12-20 NOTE — PHYSICAL EXAM
[Chaperone Present] : A chaperone was present in the examining room during all aspects of the physical examination [Appropriately responsive] : appropriately responsive [Alert] : alert [No Acute Distress] : no acute distress [No Lymphadenopathy] : no lymphadenopathy [Soft] : soft [Non-tender] : non-tender [Non-distended] : non-distended [No HSM] : No HSM [No Lesions] : no lesions [No Mass] : no mass [Oriented x3] : oriented x3 [Examination Of The Breasts] : a normal appearance [No Masses] : no breast masses were palpable [Labia Majora] : normal [Labia Minora] : normal [Normal] : normal [Absent] : absent [Uterine Adnexae] : absent [No Tenderness] : no tenderness [Nl Sphincter Tone] : normal sphincter tone

## 2022-12-20 NOTE — HISTORY OF PRESENT ILLNESS
[FreeTextEntry1] : 72 year old female   P2  present for initial annual exam. S/p RA  hysterectomy BSO  in 2011 for uterine cancer and prolapse..  \par \par She feels well and has no complaints. She has no abnormal discharge, no urinary complaints, no vaginal itchiness.  Patient has normal BM, no bloody stool, no PMB.\par \par

## 2023-05-17 ENCOUNTER — APPOINTMENT (OUTPATIENT)
Dept: ORTHOPEDIC SURGERY | Facility: CLINIC | Age: 73
End: 2023-05-17
Payer: MEDICARE

## 2023-05-17 VITALS — BODY MASS INDEX: 39.85 KG/M2 | HEIGHT: 60 IN | WEIGHT: 203 LBS

## 2023-05-17 PROCEDURE — 72100 X-RAY EXAM L-S SPINE 2/3 VWS: CPT

## 2023-05-17 PROCEDURE — 72170 X-RAY EXAM OF PELVIS: CPT

## 2023-05-17 PROCEDURE — 73562 X-RAY EXAM OF KNEE 3: CPT | Mod: LT

## 2023-05-17 PROCEDURE — 73564 X-RAY EXAM KNEE 4 OR MORE: CPT | Mod: RT

## 2023-05-17 PROCEDURE — 99214 OFFICE O/P EST MOD 30 MIN: CPT

## 2023-09-18 ENCOUNTER — OUTPATIENT (OUTPATIENT)
Dept: OUTPATIENT SERVICES | Facility: HOSPITAL | Age: 73
LOS: 1 days | End: 2023-09-18
Payer: MEDICARE

## 2023-09-18 VITALS
SYSTOLIC BLOOD PRESSURE: 121 MMHG | RESPIRATION RATE: 16 BRPM | HEART RATE: 87 BPM | HEIGHT: 60 IN | DIASTOLIC BLOOD PRESSURE: 67 MMHG | WEIGHT: 190.04 LBS | TEMPERATURE: 97 F | OXYGEN SATURATION: 96 %

## 2023-09-18 DIAGNOSIS — M17.11 UNILATERAL PRIMARY OSTEOARTHRITIS, RIGHT KNEE: ICD-10-CM

## 2023-09-18 DIAGNOSIS — Z90.3 ACQUIRED ABSENCE OF STOMACH [PART OF]: Chronic | ICD-10-CM

## 2023-09-18 DIAGNOSIS — Z96.652 PRESENCE OF LEFT ARTIFICIAL KNEE JOINT: Chronic | ICD-10-CM

## 2023-09-18 DIAGNOSIS — Z98.84 BARIATRIC SURGERY STATUS: Chronic | ICD-10-CM

## 2023-09-18 DIAGNOSIS — Z01.818 ENCOUNTER FOR OTHER PREPROCEDURAL EXAMINATION: ICD-10-CM

## 2023-09-18 DIAGNOSIS — Z90.710 ACQUIRED ABSENCE OF BOTH CERVIX AND UTERUS: Chronic | ICD-10-CM

## 2023-09-18 LAB
A1C WITH ESTIMATED AVERAGE GLUCOSE RESULT: 5.7 % — HIGH (ref 4–5.6)
ALBUMIN SERPL ELPH-MCNC: 3.7 G/DL — SIGNIFICANT CHANGE UP (ref 3.3–5)
ALP SERPL-CCNC: 77 U/L — SIGNIFICANT CHANGE UP (ref 30–120)
ALT FLD-CCNC: 18 U/L — SIGNIFICANT CHANGE UP (ref 10–60)
ANION GAP SERPL CALC-SCNC: 10 MMOL/L — SIGNIFICANT CHANGE UP (ref 5–17)
APTT BLD: 32.1 SEC — SIGNIFICANT CHANGE UP (ref 24.5–35.6)
AST SERPL-CCNC: 24 U/L — SIGNIFICANT CHANGE UP (ref 10–40)
BILIRUB SERPL-MCNC: 0.6 MG/DL — SIGNIFICANT CHANGE UP (ref 0.2–1.2)
BLD GP AB SCN SERPL QL: SIGNIFICANT CHANGE UP
BUN SERPL-MCNC: 20 MG/DL — SIGNIFICANT CHANGE UP (ref 7–23)
CALCIUM SERPL-MCNC: 10.3 MG/DL — SIGNIFICANT CHANGE UP (ref 8.4–10.5)
CHLORIDE SERPL-SCNC: 98 MMOL/L — SIGNIFICANT CHANGE UP (ref 96–108)
CO2 SERPL-SCNC: 28 MMOL/L — SIGNIFICANT CHANGE UP (ref 22–31)
CREAT SERPL-MCNC: 0.78 MG/DL — SIGNIFICANT CHANGE UP (ref 0.5–1.3)
EGFR: 81 ML/MIN/1.73M2 — SIGNIFICANT CHANGE UP
ESTIMATED AVERAGE GLUCOSE: 117 MG/DL — HIGH (ref 68–114)
GLUCOSE SERPL-MCNC: 102 MG/DL — HIGH (ref 70–99)
HCT VFR BLD CALC: 39.9 % — SIGNIFICANT CHANGE UP (ref 34.5–45)
HGB BLD-MCNC: 13 G/DL — SIGNIFICANT CHANGE UP (ref 11.5–15.5)
INR BLD: 1.09 RATIO — SIGNIFICANT CHANGE UP (ref 0.85–1.18)
MCHC RBC-ENTMCNC: 30.6 PG — SIGNIFICANT CHANGE UP (ref 27–34)
MCHC RBC-ENTMCNC: 32.6 GM/DL — SIGNIFICANT CHANGE UP (ref 32–36)
MCV RBC AUTO: 93.9 FL — SIGNIFICANT CHANGE UP (ref 80–100)
MRSA PCR RESULT.: SIGNIFICANT CHANGE UP
NRBC # BLD: 0 /100 WBCS — SIGNIFICANT CHANGE UP (ref 0–0)
PLATELET # BLD AUTO: 245 K/UL — SIGNIFICANT CHANGE UP (ref 150–400)
POTASSIUM SERPL-MCNC: 3.7 MMOL/L — SIGNIFICANT CHANGE UP (ref 3.5–5.3)
POTASSIUM SERPL-SCNC: 3.7 MMOL/L — SIGNIFICANT CHANGE UP (ref 3.5–5.3)
PROT SERPL-MCNC: 7.9 G/DL — SIGNIFICANT CHANGE UP (ref 6–8.3)
PROTHROM AB SERPL-ACNC: 12.2 SEC — SIGNIFICANT CHANGE UP (ref 9.5–13)
RBC # BLD: 4.25 M/UL — SIGNIFICANT CHANGE UP (ref 3.8–5.2)
RBC # FLD: 13 % — SIGNIFICANT CHANGE UP (ref 10.3–14.5)
S AUREUS DNA NOSE QL NAA+PROBE: SIGNIFICANT CHANGE UP
SODIUM SERPL-SCNC: 136 MMOL/L — SIGNIFICANT CHANGE UP (ref 135–145)
WBC # BLD: 7.24 K/UL — SIGNIFICANT CHANGE UP (ref 3.8–10.5)
WBC # FLD AUTO: 7.24 K/UL — SIGNIFICANT CHANGE UP (ref 3.8–10.5)

## 2023-09-18 PROCEDURE — 93010 ELECTROCARDIOGRAM REPORT: CPT

## 2023-09-18 PROCEDURE — 93005 ELECTROCARDIOGRAM TRACING: CPT

## 2023-09-18 PROCEDURE — G0463: CPT

## 2023-09-18 NOTE — H&P PST ADULT - NSICDXFAMILYHX_GEN_ALL_CORE_FT
FAMILY HISTORY:  Family history of atrial fibrillation    Sibling  Still living? Yes, Estimated age: Age Unknown  Family history of COPD (chronic obstructive pulmonary disease), Age at diagnosis: Age Unknown

## 2023-09-18 NOTE — H&P PST ADULT - HISTORY OF PRESENT ILLNESS
72 yo female reports right knee pain which is exacerbated with weight bearing.  She has tried HA injections without relief.    She is scheduled for right total knee replacement on 10/3/2023 @ [facility]

## 2023-09-18 NOTE — H&P PST ADULT - MUSCULOSKELETAL
details… no calf tenderness/decreased ROM due to pain/joint swelling/no chest wall tenderness/extremities exam

## 2023-09-18 NOTE — H&P PST ADULT - NSICDXPASTMEDICALHX_GEN_ALL_CORE_FT
PAST MEDICAL HISTORY:  At risk for sleep apnea     Class 2 obesity with body mass index (BMI) of 37.0 to 37.9 in adult     COVID-19 vaccine series completed 4/3/21    HTN (Hypertension) no meds    Osteoarthritis of right knee     Uterine Cancer 2011, surgery

## 2023-09-18 NOTE — H&P PST ADULT - PROBLEM SELECTOR PLAN 1
Right total knee replacement is planned for 10/3/2023  Diagnostic testing performed  Medical clearance requested  pre op instructions reviewed and given in writing  best wishes offered

## 2023-09-18 NOTE — H&P PST ADULT - NSICDXPASTSURGICALHX_GEN_ALL_CORE_FT
PAST SURGICAL HISTORY:  H/O bariatric surgery [id number]. lost 70 lbs sleeve gastrectomy    H/O total hysterectomy 2011    History of sleeve gastrectomy     History of total left knee replacement     Incisional Hernia 2003    S/P Cholecystectomy 1999    S/P D&C 1980

## 2023-09-26 DIAGNOSIS — Z01.818 ENCOUNTER FOR OTHER PREPROCEDURAL EXAMINATION: ICD-10-CM

## 2023-09-27 ENCOUNTER — APPOINTMENT (OUTPATIENT)
Dept: ORTHOPEDIC SURGERY | Facility: CLINIC | Age: 73
End: 2023-09-27
Payer: MEDICARE

## 2023-09-27 VITALS — BODY MASS INDEX: 38.68 KG/M2 | HEIGHT: 60 IN | WEIGHT: 197 LBS

## 2023-09-27 PROBLEM — M17.11 UNILATERAL PRIMARY OSTEOARTHRITIS, RIGHT KNEE: Chronic | Status: ACTIVE | Noted: 2023-09-18

## 2023-09-27 PROBLEM — E66.9 OBESITY, UNSPECIFIED: Chronic | Status: ACTIVE | Noted: 2023-09-18

## 2023-09-27 PROBLEM — Z91.89 OTHER SPECIFIED PERSONAL RISK FACTORS, NOT ELSEWHERE CLASSIFIED: Chronic | Status: ACTIVE | Noted: 2023-09-18

## 2023-09-27 PROCEDURE — 73564 X-RAY EXAM KNEE 4 OR MORE: CPT | Mod: RT

## 2023-09-27 PROCEDURE — 99214 OFFICE O/P EST MOD 30 MIN: CPT

## 2023-10-02 ENCOUNTER — TRANSCRIPTION ENCOUNTER (OUTPATIENT)
Age: 73
End: 2023-10-02

## 2023-10-03 ENCOUNTER — TRANSCRIPTION ENCOUNTER (OUTPATIENT)
Age: 73
End: 2023-10-03

## 2023-10-03 ENCOUNTER — INPATIENT (INPATIENT)
Facility: HOSPITAL | Age: 73
LOS: 0 days | Discharge: HOME | DRG: 470 | End: 2023-10-04
Attending: SPECIALIST | Admitting: SPECIALIST
Payer: COMMERCIAL

## 2023-10-03 ENCOUNTER — APPOINTMENT (OUTPATIENT)
Dept: ORTHOPEDIC SURGERY | Facility: HOSPITAL | Age: 73
End: 2023-10-03

## 2023-10-03 VITALS
RESPIRATION RATE: 18 BRPM | DIASTOLIC BLOOD PRESSURE: 62 MMHG | SYSTOLIC BLOOD PRESSURE: 129 MMHG | HEART RATE: 73 BPM | OXYGEN SATURATION: 100 % | HEIGHT: 60 IN | WEIGHT: 195.99 LBS | TEMPERATURE: 97 F

## 2023-10-03 DIAGNOSIS — M17.11 UNILATERAL PRIMARY OSTEOARTHRITIS, RIGHT KNEE: ICD-10-CM

## 2023-10-03 DIAGNOSIS — Z90.3 ACQUIRED ABSENCE OF STOMACH [PART OF]: Chronic | ICD-10-CM

## 2023-10-03 DIAGNOSIS — Z98.84 BARIATRIC SURGERY STATUS: Chronic | ICD-10-CM

## 2023-10-03 DIAGNOSIS — Z90.710 ACQUIRED ABSENCE OF BOTH CERVIX AND UTERUS: Chronic | ICD-10-CM

## 2023-10-03 DIAGNOSIS — Z96.652 PRESENCE OF LEFT ARTIFICIAL KNEE JOINT: Chronic | ICD-10-CM

## 2023-10-03 PROCEDURE — 99222 1ST HOSP IP/OBS MODERATE 55: CPT

## 2023-10-03 PROCEDURE — 27447 TOTAL KNEE ARTHROPLASTY: CPT | Mod: RT

## 2023-10-03 PROCEDURE — 73560 X-RAY EXAM OF KNEE 1 OR 2: CPT | Mod: 26,RT

## 2023-10-03 DEVICE — FEMORAL 62.5 RIGHT: Type: IMPLANTABLE DEVICE | Site: RIGHT | Status: FUNCTIONAL

## 2023-10-03 DEVICE — TRAY TIB I-BEAM FIX BAR 71MM: Type: IMPLANTABLE DEVICE | Site: RIGHT | Status: FUNCTIONAL

## 2023-10-03 DEVICE — BEARING TIB VANGUARD VE PS 71/75X14MM: Type: IMPLANTABLE DEVICE | Site: RIGHT | Status: FUNCTIONAL

## 2023-10-03 DEVICE — PAT 3 PEG 34MM POLY: Type: IMPLANTABLE DEVICE | Site: RIGHT | Status: FUNCTIONAL

## 2023-10-03 DEVICE — IMPLANTABLE DEVICE: Type: IMPLANTABLE DEVICE | Site: RIGHT | Status: FUNCTIONAL

## 2023-10-03 RX ORDER — CELECOXIB 200 MG/1
200 CAPSULE ORAL EVERY 12 HOURS
Refills: 0 | Status: DISCONTINUED | OUTPATIENT
Start: 2023-10-03 | End: 2023-10-04

## 2023-10-03 RX ORDER — OXYCODONE HYDROCHLORIDE 5 MG/1
5 TABLET ORAL
Refills: 0 | Status: DISCONTINUED | OUTPATIENT
Start: 2023-10-03 | End: 2023-10-04

## 2023-10-03 RX ORDER — HYDROMORPHONE HYDROCHLORIDE 2 MG/ML
0.5 INJECTION INTRAMUSCULAR; INTRAVENOUS; SUBCUTANEOUS
Refills: 0 | Status: DISCONTINUED | OUTPATIENT
Start: 2023-10-03 | End: 2023-10-03

## 2023-10-03 RX ORDER — ACETAMINOPHEN 500 MG
1000 TABLET ORAL EVERY 8 HOURS
Refills: 0 | Status: DISCONTINUED | OUTPATIENT
Start: 2023-10-04 | End: 2023-10-04

## 2023-10-03 RX ORDER — TRANEXAMIC ACID 100 MG/ML
1000 INJECTION, SOLUTION INTRAVENOUS ONCE
Refills: 0 | Status: COMPLETED | OUTPATIENT
Start: 2023-10-03 | End: 2023-10-03

## 2023-10-03 RX ORDER — SODIUM CHLORIDE 9 MG/ML
500 INJECTION INTRAMUSCULAR; INTRAVENOUS; SUBCUTANEOUS ONCE
Refills: 0 | Status: COMPLETED | OUTPATIENT
Start: 2023-10-03 | End: 2023-10-03

## 2023-10-03 RX ORDER — CELECOXIB 200 MG/1
200 CAPSULE ORAL EVERY 12 HOURS
Refills: 0 | Status: DISCONTINUED | OUTPATIENT
Start: 2023-10-03 | End: 2023-10-03

## 2023-10-03 RX ORDER — CEFAZOLIN SODIUM 1 G
2000 VIAL (EA) INJECTION EVERY 8 HOURS
Refills: 0 | Status: COMPLETED | OUTPATIENT
Start: 2023-10-03 | End: 2023-10-04

## 2023-10-03 RX ORDER — MAGNESIUM HYDROXIDE 400 MG/1
30 TABLET, CHEWABLE ORAL DAILY
Refills: 0 | Status: DISCONTINUED | OUTPATIENT
Start: 2023-10-03 | End: 2023-10-04

## 2023-10-03 RX ORDER — POLYETHYLENE GLYCOL 3350 17 G/17G
17 POWDER, FOR SOLUTION ORAL AT BEDTIME
Refills: 0 | Status: DISCONTINUED | OUTPATIENT
Start: 2023-10-03 | End: 2023-10-04

## 2023-10-03 RX ORDER — SODIUM CHLORIDE 9 MG/ML
1000 INJECTION, SOLUTION INTRAVENOUS
Refills: 0 | Status: DISCONTINUED | OUTPATIENT
Start: 2023-10-04 | End: 2023-10-04

## 2023-10-03 RX ORDER — HYDROCHLOROTHIAZIDE 25 MG
1 TABLET ORAL
Refills: 0 | DISCHARGE

## 2023-10-03 RX ORDER — APREPITANT 80 MG/1
40 CAPSULE ORAL ONCE
Refills: 0 | Status: COMPLETED | OUTPATIENT
Start: 2023-10-03 | End: 2023-10-03

## 2023-10-03 RX ORDER — CEFAZOLIN SODIUM 1 G
2000 VIAL (EA) INJECTION ONCE
Refills: 0 | Status: COMPLETED | OUTPATIENT
Start: 2023-10-03 | End: 2023-10-03

## 2023-10-03 RX ORDER — SODIUM CHLORIDE 9 MG/ML
1000 INJECTION, SOLUTION INTRAVENOUS
Refills: 0 | Status: DISCONTINUED | OUTPATIENT
Start: 2023-10-03 | End: 2023-10-03

## 2023-10-03 RX ORDER — HYDROMORPHONE HYDROCHLORIDE 2 MG/ML
0.5 INJECTION INTRAMUSCULAR; INTRAVENOUS; SUBCUTANEOUS
Refills: 0 | Status: DISCONTINUED | OUTPATIENT
Start: 2023-10-03 | End: 2023-10-04

## 2023-10-03 RX ORDER — SENNA PLUS 8.6 MG/1
2 TABLET ORAL AT BEDTIME
Refills: 0 | Status: DISCONTINUED | OUTPATIENT
Start: 2023-10-03 | End: 2023-10-04

## 2023-10-03 RX ORDER — HYDROMORPHONE HYDROCHLORIDE 2 MG/ML
0.2 INJECTION INTRAMUSCULAR; INTRAVENOUS; SUBCUTANEOUS
Refills: 0 | Status: DISCONTINUED | OUTPATIENT
Start: 2023-10-03 | End: 2023-10-03

## 2023-10-03 RX ORDER — LOSARTAN POTASSIUM 100 MG/1
100 TABLET, FILM COATED ORAL DAILY
Refills: 0 | Status: DISCONTINUED | OUTPATIENT
Start: 2023-10-05 | End: 2023-10-04

## 2023-10-03 RX ORDER — ONDANSETRON 8 MG/1
4 TABLET, FILM COATED ORAL EVERY 6 HOURS
Refills: 0 | Status: DISCONTINUED | OUTPATIENT
Start: 2023-10-03 | End: 2023-10-04

## 2023-10-03 RX ORDER — ACETAMINOPHEN 500 MG
1000 TABLET ORAL ONCE
Refills: 0 | Status: COMPLETED | OUTPATIENT
Start: 2023-10-03 | End: 2023-10-03

## 2023-10-03 RX ORDER — CHLORHEXIDINE GLUCONATE 213 G/1000ML
1 SOLUTION TOPICAL ONCE
Refills: 0 | Status: COMPLETED | OUTPATIENT
Start: 2023-10-03 | End: 2023-10-03

## 2023-10-03 RX ORDER — ACETAMINOPHEN 500 MG
1000 TABLET ORAL ONCE
Refills: 0 | Status: COMPLETED | OUTPATIENT
Start: 2023-10-04 | End: 2023-10-04

## 2023-10-03 RX ORDER — ONDANSETRON 8 MG/1
4 TABLET, FILM COATED ORAL ONCE
Refills: 0 | Status: DISCONTINUED | OUTPATIENT
Start: 2023-10-03 | End: 2023-10-03

## 2023-10-03 RX ORDER — OXYCODONE HYDROCHLORIDE 5 MG/1
10 TABLET ORAL
Refills: 0 | Status: DISCONTINUED | OUTPATIENT
Start: 2023-10-03 | End: 2023-10-04

## 2023-10-03 RX ORDER — OLMESARTAN MEDOXOMIL 5 MG/1
1 TABLET, FILM COATED ORAL
Qty: 0 | Refills: 0 | DISCHARGE

## 2023-10-03 RX ORDER — DEXAMETHASONE 0.5 MG/5ML
8 ELIXIR ORAL ONCE
Refills: 0 | Status: COMPLETED | OUTPATIENT
Start: 2023-10-04 | End: 2023-10-04

## 2023-10-03 RX ORDER — PANTOPRAZOLE SODIUM 20 MG/1
40 TABLET, DELAYED RELEASE ORAL
Refills: 0 | Status: DISCONTINUED | OUTPATIENT
Start: 2023-10-03 | End: 2023-10-04

## 2023-10-03 RX ORDER — APIXABAN 2.5 MG/1
2.5 TABLET, FILM COATED ORAL EVERY 12 HOURS
Refills: 0 | Status: DISCONTINUED | OUTPATIENT
Start: 2023-10-04 | End: 2023-10-04

## 2023-10-03 RX ADMIN — SODIUM CHLORIDE 75 MILLILITER(S): 9 INJECTION, SOLUTION INTRAVENOUS at 15:04

## 2023-10-03 RX ADMIN — SODIUM CHLORIDE 500 MILLILITER(S): 9 INJECTION INTRAMUSCULAR; INTRAVENOUS; SUBCUTANEOUS at 15:02

## 2023-10-03 RX ADMIN — Medication 100 MILLIGRAM(S): at 20:19

## 2023-10-03 RX ADMIN — CHLORHEXIDINE GLUCONATE 1 APPLICATION(S): 213 SOLUTION TOPICAL at 10:01

## 2023-10-03 RX ADMIN — CELECOXIB 200 MILLIGRAM(S): 200 CAPSULE ORAL at 21:53

## 2023-10-03 RX ADMIN — APREPITANT 40 MILLIGRAM(S): 80 CAPSULE ORAL at 10:01

## 2023-10-03 RX ADMIN — OXYCODONE HYDROCHLORIDE 5 MILLIGRAM(S): 5 TABLET ORAL at 21:53

## 2023-10-03 RX ADMIN — CELECOXIB 200 MILLIGRAM(S): 200 CAPSULE ORAL at 23:07

## 2023-10-03 RX ADMIN — SODIUM CHLORIDE 500 MILLILITER(S): 9 INJECTION INTRAMUSCULAR; INTRAVENOUS; SUBCUTANEOUS at 18:25

## 2023-10-03 RX ADMIN — Medication 400 MILLIGRAM(S): at 18:24

## 2023-10-03 RX ADMIN — Medication 1000 MILLIGRAM(S): at 18:24

## 2023-10-03 RX ADMIN — OXYCODONE HYDROCHLORIDE 5 MILLIGRAM(S): 5 TABLET ORAL at 22:30

## 2023-10-03 NOTE — BRIEF OPERATIVE NOTE - NSICDXBRIEFPOSTOP_GEN_ALL_CORE_FT
POST-OP DIAGNOSIS:  DJD (degenerative joint disease) of knee 03-Oct-2023 14:37:07 Right Hebert Gonsalez

## 2023-10-03 NOTE — DISCHARGE NOTE PROVIDER - NSDCCPTREATMENT_GEN_ALL_CORE_FT
PRINCIPAL PROCEDURE  Procedure: Right total knee replacement  Findings and Treatment: Severe DJD right knee.

## 2023-10-03 NOTE — PHYSICAL THERAPY INITIAL EVALUATION ADULT - ADDITIONAL COMMENTS
Pt resides in pvt home with spouse, however pt  is in rehab currently; pt sister and KATY will be staying with pt upon d/c to assist as needed. Pt states 5STE +1HR, resides on first floor. Pt has RW, raised toilet seat and cane. Pt has a walk-in tub. Pt reports was independent prior to admission with intermittent use of cane.

## 2023-10-03 NOTE — DISCHARGE NOTE PROVIDER - PROVIDER TOKENS
PROVIDER:[TOKEN:[[id number]],SCHEDULEDAPPT:[10/19/2023],SCHEDULEDAPPTTIME:[09:00 AM],ESTABLISHEDPATIENT:[T]]

## 2023-10-03 NOTE — DISCHARGE NOTE PROVIDER - NSDCMRMEDTOKEN_GEN_ALL_CORE_FT
hydroCHLOROthiazide 25 mg oral tablet: 1 orally once a day  olmesartan 40 mg oral tablet: 1 tab(s) orally once a day   acetaminophen 500 mg oral tablet: 2 tab(s) orally every 8 hours  apixaban 2.5 mg oral tablet: 1 tab(s) orally every 12 hours  aspirin 81 mg oral delayed release tablet: 1 tab(s) orally every 12 hours start after Eliquis is complete  celecoxib 200 mg oral capsule: 1 cap(s) orally every 12 hours  hydroCHLOROthiazide 25 mg oral tablet: 1 orally once a day  olmesartan 40 mg oral tablet: 1 tab(s) orally once a day  omeprazole 20 mg oral delayed release capsule: 1 cap(s) orally once a day  oxyCODONE 5 mg oral tablet: 1 tab(s) orally every 4 hours as needed for  severe pain MDD: 6  polyethylene glycol 3350 oral powder for reconstitution: 17 gram(s) orally once a day (at bedtime)  senna leaf extract oral tablet: 2 tab(s) orally once a day (at bedtime)

## 2023-10-03 NOTE — CONSULT NOTE ADULT - ASSESSMENT
73F HTN, and OA admitted for aftercare following Right TKR    S/P Right TKR  POD 0    Continue Bowel and pain control regimen;    Incentive Spirometer for lung expansion;   Monitor Hgb and follow up electrolytes.      HTN  Controlled for now  Hold ARB and HCTZ until POD 2    Diet  Regular    DVT Prophylaxis   Aspirin BID    Disposition  Discharge planning pending hospital course

## 2023-10-03 NOTE — PATIENT PROFILE ADULT - NSTOBACCONEVERSMOKERY/N_GEN_A
Health Maintenance Summary     Topic Due On Due Status Completed On Postpone Until Reason    IMMUNIZATION - HPV   Completed Dec 28, 2011      IMMUNIZATION - DTaP/Tdap/Td Apr 16, 2018 Postponed Apr 16, 2008 Jul 2, 2018 Patient Refused    Immunization-Influenza Sep 1, 2018 Not Due Oct 3, 2017      Depression Screening Oct 3, 2018 Not Due Oct 3, 2017            Patient is due for topics as listed above, he wishes to decline at this time . Patient has been running a fever and will make a nurse visit.             No

## 2023-10-03 NOTE — DISCHARGE NOTE PROVIDER - HOSPITAL COURSE
This patient was admitted to [facility] with a history of severe degenerative joint disease of the right knee.  Patient underwent Pre-Surgical Testing and was medically cleared to undergo elective procedure. Patient underwent Right TKR by Dr. Brodie Patel on 10/3/23. Procedure was well tolerated.  No operative or beatrice-operative complications arose during patient's hospital course.  Patient received antibiotic according to SCIP guidelines for infection prevention.  Eliquis 2.5mg q 12 h was given for DVT prophylaxis, in addition to the use of SCDs.  Anesthesia, Medical Hospitalist, Physical Therapy and Occupational Therapy were consulted. Patient is stable for discharge with a good prognosis.  Appropriate discharge instructions and medications are provided in this document.  Patient is going home with home care (PT/OT/Skilled Nursing).

## 2023-10-03 NOTE — DISCHARGE NOTE PROVIDER - NSDCCPCAREPLAN_GEN_ALL_CORE_FT
PRINCIPAL DISCHARGE DIAGNOSIS  Diagnosis: Primary osteoarthritis of right knee  Assessment and Plan of Treatment: For your total knee replacement:  Physical Therapy/Occupational Therapy for: ambulation, transfers, stairs, ADL's (activities of daily living), range of motion exercises, and isometrics  -Activity  • Weight Bearing as tolerated with rolling walker.  • Cryo/cuff 20 minutes several times daily with at least a 1 hour break in between icing sessions  • Take short, frequent walks increasing the distance that you walk each day as tolerated.  • Change your position every hour to decrease pain and stiffness.  • Continue the exercises taught to you by your physical therapist.  • No driving until cleared by the doctor.  • No tub baths, hot tubs, or swimming pools until instructed by your doctor.  • Do not squat down on the floor.  • Do not kneel or twist your knee.  • Range of Motion Goals: Flexion= 120 degrees, Extension = 0 degrees  You have a Mepilex dressing. You may shower. Mepilex dressing is water resistant, not waterproof. Do not aim shower stream at surgical site.  Pat dry after showering.  Remove Mepilex dressing in 1 week.   Keep incision clean. DO NOT APPLY ANYTHING to incision site (salves/ointments/creams).  May shower.  Do not scrub incision site. Pat dry after shower. Sutures will be removed at surgeon's office during first post-op appointment, 2 weeks after surgery.

## 2023-10-03 NOTE — DISCHARGE NOTE PROVIDER - NSDCFUSCHEDAPPT_GEN_ALL_CORE_FT
Brodie Patel  [facility]  ORTHOSURG [address]  Scheduled Appointment: 10/19/2023

## 2023-10-03 NOTE — BRIEF OPERATIVE NOTE - NSICDXBRIEFPREOP_GEN_ALL_CORE_FT
PRE-OP DIAGNOSIS:  DJD (degenerative joint disease) of knee 03-Oct-2023 14:37:04 Right Hebert Gonsalez

## 2023-10-03 NOTE — DISCHARGE NOTE PROVIDER - CARE PROVIDER_API CALL
Brodie Patel)  Orthopaedic Surgery  [address], [address]  [place] [address]  Phone: [phone]  Fax: [phone]  Established Patient  Scheduled Appointment: 10/19/2023 09:00 AM

## 2023-10-03 NOTE — CONSULT NOTE ADULT - SUBJECTIVE AND OBJECTIVE BOX
HPI: 73F HTN, and OA has been combatting pain in right knee for several years which has progressively worsened.  Patient has tried multiple options for pain relief including OTC medication and as well as PT with minimal relief and has undergone elective replacement of right knee successfully.  Patient is currently resting in bed comfortable with good pain control.     REVIEW OF SYSTEMS:  CONSTITUTIONAL: No fever, weight loss, or fatigue  EYES: No eye pain, visual disturbances, or discharge  ENMT:  No difficulty hearing, tinnitus, vertigo; No sinus or throat pain  NECK: No pain or stiffness  RESPIRATORY: No cough, wheezing, chills or hemoptysis; No shortness of breath  CARDIOVASCULAR: No chest pain, palpitations, dizziness, or leg swelling  GASTROINTESTINAL: No abdominal or epigastric pain. No nausea, vomiting, or hematemesis; No diarrhea or constipation. No melena or hematochezia.  GENITOURINARY: No dysuria, frequency, hematuria, or incontinence  NEUROLOGICAL: No headaches, memory loss, loss of strength, numbness, or tremors  MUSCULOSKELETAL: No muscle or back pain      PAST MEDICAL & SURGICAL HISTORY:  HTN (Hypertension)  no meds      Uterine Cancer  2011, surgery      COVID-19 vaccine series completed  4/3/21      Osteoarthritis of right knee      At risk for sleep apnea      Class 2 obesity with body mass index (BMI) of 37.0 to 37.9 in adult      S/P D&C  1980      S/P Cholecystectomy  1999      Incisional Hernia  2003      H/O total hysterectomy  2011      H/O bariatric surgery  feb2015. lost 70 lbs sleeve gastrectomy      History of sleeve gastrectomy      History of total left knee replacement          SOCIAL HISTORY:  Tobacco; EtOH; Illicit Drugs    Allergies    Effexor (Other)    Intolerances        MEDICATIONS  (STANDING):  lactated ringers. 1000 milliLiter(s) (75 mL/Hr) IV Continuous <Continuous>    MEDICATIONS  (PRN):  HYDROmorphone  Injectable 0.5 milliGRAM(s) IV Push every 10 minutes PRN Severe Pain (7 - 10)  HYDROmorphone  Injectable 0.2 milliGRAM(s) IV Push every 10 minutes PRN Moderate Pain (4 - 6)  ondansetron Injectable 4 milliGRAM(s) IV Push once PRN Nausea and/or Vomiting      FAMILY HISTORY:  Family history of atrial fibrillation    Family history of COPD (chronic obstructive pulmonary disease) (Sibling)        Vital Signs Last 24 Hrs  T(C): 36.6 (03 Oct 2023 14:40), Max: 36.6 (03 Oct 2023 14:40)  T(F): 97.8 (03 Oct 2023 14:40), Max: 97.8 (03 Oct 2023 14:40)  HR: 67 (03 Oct 2023 15:30) (63 - 73)  BP: 100/42 (03 Oct 2023 15:30) (100/42 - 129/62)  BP(mean): 57 (03 Oct 2023 15:30) (57 - 65)  RR: 12 (03 Oct 2023 15:30) (11 - 18)  SpO2: 100% (03 Oct 2023 15:30) (99% - 100%)    Parameters below as of 03 Oct 2023 14:40  Patient On (Oxygen Delivery Method): nasal cannula  O2 Flow (L/min): 3      PHYSICAL EXAM:    GENERAL: NAD, well-developed  HEAD:  Atraumatic, Normocephalic  EYES: EOMI, PERRLA, conjunctiva and sclera clear  ENMT: No tonsillar erythema, exudates, or enlargement; Moist mucous membranes, Good dentition, No lesions  NECK: Supple, No JVD, Normal thyroid  NERVOUS SYSTEM:  Alert & Oriented X3, Good concentration;  CHEST/LUNG: Clear to auscultation bilaterally; No rales, rhonchi, wheezing, or rubs  HEART: Regular rate and rhythm; No murmurs, rubs, or gallops  ABDOMEN: Soft, Nontender, Nondistended; Bowel sounds present  EXTREMITIES:  2+ Peripheral Pulses, No clubbing, cyanosis, or edema      LABS:              CAPILLARY BLOOD GLUCOSE          RADIOLOGY & ADDITIONAL STUDIES:    EKG:   Personally Reviewed:  [ ] YES     Imaging:   Personally Reviewed:  [ ] YES     Consultant(s) Notes Reviewed:      Care Discussed with Consultants/Other Providers:

## 2023-10-04 ENCOUNTER — TRANSCRIPTION ENCOUNTER (OUTPATIENT)
Age: 73
End: 2023-10-04

## 2023-10-04 VITALS
OXYGEN SATURATION: 95 % | RESPIRATION RATE: 18 BRPM | SYSTOLIC BLOOD PRESSURE: 93 MMHG | DIASTOLIC BLOOD PRESSURE: 55 MMHG | HEART RATE: 67 BPM | TEMPERATURE: 98 F

## 2023-10-04 LAB
ANION GAP SERPL CALC-SCNC: 4 MMOL/L — LOW (ref 5–17)
BUN SERPL-MCNC: 19 MG/DL — SIGNIFICANT CHANGE UP (ref 7–23)
CALCIUM SERPL-MCNC: 8.7 MG/DL — SIGNIFICANT CHANGE UP (ref 8.4–10.5)
CHLORIDE SERPL-SCNC: 98 MMOL/L — SIGNIFICANT CHANGE UP (ref 96–108)
CO2 SERPL-SCNC: 29 MMOL/L — SIGNIFICANT CHANGE UP (ref 22–31)
CREAT SERPL-MCNC: 0.62 MG/DL — SIGNIFICANT CHANGE UP (ref 0.5–1.3)
EGFR: 95 ML/MIN/1.73M2 — SIGNIFICANT CHANGE UP
GLUCOSE SERPL-MCNC: 150 MG/DL — HIGH (ref 70–99)
HCT VFR BLD CALC: 29.7 % — LOW (ref 34.5–45)
HGB BLD-MCNC: 10.2 G/DL — LOW (ref 11.5–15.5)
MCHC RBC-ENTMCNC: 31.7 PG — SIGNIFICANT CHANGE UP (ref 27–34)
MCHC RBC-ENTMCNC: 34.3 GM/DL — SIGNIFICANT CHANGE UP (ref 32–36)
MCV RBC AUTO: 92.2 FL — SIGNIFICANT CHANGE UP (ref 80–100)
NRBC # BLD: 0 /100 WBCS — SIGNIFICANT CHANGE UP (ref 0–0)
PLATELET # BLD AUTO: 194 K/UL — SIGNIFICANT CHANGE UP (ref 150–400)
POTASSIUM SERPL-MCNC: 4.4 MMOL/L — SIGNIFICANT CHANGE UP (ref 3.5–5.3)
POTASSIUM SERPL-SCNC: 4.4 MMOL/L — SIGNIFICANT CHANGE UP (ref 3.5–5.3)
RBC # BLD: 3.22 M/UL — LOW (ref 3.8–5.2)
RBC # FLD: 12.6 % — SIGNIFICANT CHANGE UP (ref 10.3–14.5)
SODIUM SERPL-SCNC: 131 MMOL/L — LOW (ref 135–145)
WBC # BLD: 11.43 K/UL — HIGH (ref 3.8–10.5)
WBC # FLD AUTO: 11.43 K/UL — HIGH (ref 3.8–10.5)

## 2023-10-04 PROCEDURE — 97530 THERAPEUTIC ACTIVITIES: CPT

## 2023-10-04 PROCEDURE — 99232 SBSQ HOSP IP/OBS MODERATE 35: CPT

## 2023-10-04 PROCEDURE — 73560 X-RAY EXAM OF KNEE 1 OR 2: CPT

## 2023-10-04 PROCEDURE — 97110 THERAPEUTIC EXERCISES: CPT

## 2023-10-04 PROCEDURE — 97161 PT EVAL LOW COMPLEX 20 MIN: CPT

## 2023-10-04 PROCEDURE — 36415 COLL VENOUS BLD VENIPUNCTURE: CPT

## 2023-10-04 PROCEDURE — C1713: CPT

## 2023-10-04 PROCEDURE — 97165 OT EVAL LOW COMPLEX 30 MIN: CPT

## 2023-10-04 PROCEDURE — 80048 BASIC METABOLIC PNL TOTAL CA: CPT

## 2023-10-04 PROCEDURE — 27447 TOTAL KNEE ARTHROPLASTY: CPT

## 2023-10-04 PROCEDURE — C1776: CPT

## 2023-10-04 PROCEDURE — 85027 COMPLETE CBC AUTOMATED: CPT

## 2023-10-04 PROCEDURE — 97116 GAIT TRAINING THERAPY: CPT

## 2023-10-04 RX ORDER — OXYCODONE HYDROCHLORIDE 5 MG/1
1 TABLET ORAL
Qty: 42 | Refills: 0
Start: 2023-10-04 | End: 2023-10-10

## 2023-10-04 RX ORDER — OMEPRAZOLE 10 MG/1
1 CAPSULE, DELAYED RELEASE ORAL
Qty: 30 | Refills: 0
Start: 2023-10-04 | End: 2023-11-02

## 2023-10-04 RX ORDER — ACETAMINOPHEN 500 MG
2 TABLET ORAL
Qty: 0 | Refills: 0 | DISCHARGE
Start: 2023-10-04

## 2023-10-04 RX ORDER — ASPIRIN/CALCIUM CARB/MAGNESIUM 324 MG
1 TABLET ORAL
Qty: 28 | Refills: 0
Start: 2023-10-04 | End: 2023-10-17

## 2023-10-04 RX ORDER — APIXABAN 2.5 MG/1
1 TABLET, FILM COATED ORAL
Qty: 28 | Refills: 0
Start: 2023-10-04 | End: 2023-10-17

## 2023-10-04 RX ORDER — POLYETHYLENE GLYCOL 3350 17 G/17G
17 POWDER, FOR SOLUTION ORAL
Qty: 0 | Refills: 0 | DISCHARGE
Start: 2023-10-04

## 2023-10-04 RX ORDER — CELECOXIB 200 MG/1
1 CAPSULE ORAL
Qty: 30 | Refills: 0
Start: 2023-10-04

## 2023-10-04 RX ORDER — SENNA PLUS 8.6 MG/1
2 TABLET ORAL
Qty: 0 | Refills: 0 | DISCHARGE
Start: 2023-10-04

## 2023-10-04 RX ADMIN — OXYCODONE HYDROCHLORIDE 5 MILLIGRAM(S): 5 TABLET ORAL at 10:10

## 2023-10-04 RX ADMIN — Medication 101.6 MILLIGRAM(S): at 06:04

## 2023-10-04 RX ADMIN — Medication 1000 MILLIGRAM(S): at 06:27

## 2023-10-04 RX ADMIN — PANTOPRAZOLE SODIUM 40 MILLIGRAM(S): 20 TABLET, DELAYED RELEASE ORAL at 06:04

## 2023-10-04 RX ADMIN — OXYCODONE HYDROCHLORIDE 5 MILLIGRAM(S): 5 TABLET ORAL at 14:17

## 2023-10-04 RX ADMIN — Medication 400 MILLIGRAM(S): at 01:02

## 2023-10-04 RX ADMIN — OXYCODONE HYDROCHLORIDE 5 MILLIGRAM(S): 5 TABLET ORAL at 06:28

## 2023-10-04 RX ADMIN — OXYCODONE HYDROCHLORIDE 5 MILLIGRAM(S): 5 TABLET ORAL at 15:00

## 2023-10-04 RX ADMIN — CELECOXIB 200 MILLIGRAM(S): 200 CAPSULE ORAL at 08:40

## 2023-10-04 RX ADMIN — Medication 1000 MILLIGRAM(S): at 13:44

## 2023-10-04 RX ADMIN — OXYCODONE HYDROCHLORIDE 5 MILLIGRAM(S): 5 TABLET ORAL at 06:03

## 2023-10-04 RX ADMIN — Medication 100 MILLIGRAM(S): at 04:28

## 2023-10-04 RX ADMIN — Medication 1000 MILLIGRAM(S): at 06:03

## 2023-10-04 RX ADMIN — OXYCODONE HYDROCHLORIDE 5 MILLIGRAM(S): 5 TABLET ORAL at 01:45

## 2023-10-04 RX ADMIN — OXYCODONE HYDROCHLORIDE 5 MILLIGRAM(S): 5 TABLET ORAL at 01:08

## 2023-10-04 RX ADMIN — OXYCODONE HYDROCHLORIDE 5 MILLIGRAM(S): 5 TABLET ORAL at 09:10

## 2023-10-04 RX ADMIN — SODIUM CHLORIDE 100 MILLILITER(S): 9 INJECTION, SOLUTION INTRAVENOUS at 01:08

## 2023-10-04 RX ADMIN — Medication 1000 MILLIGRAM(S): at 01:51

## 2023-10-04 RX ADMIN — APIXABAN 2.5 MILLIGRAM(S): 2.5 TABLET, FILM COATED ORAL at 08:40

## 2023-10-04 NOTE — PROGRESS NOTE ADULT - SUBJECTIVE AND OBJECTIVE BOX
ORTHOPEDIC ATTENDING PROGRESS NOTE  GAB WRIGHT      73y Female                                                                                                                               POD #    1    STATUS POST:               Pre-Op Dx: DJD (degenerative joint disease) of knee  Right      Post-Op Dx:  DJD (degenerative joint disease) of knee  Right      Procedure: Knee replacement  Right                                                  Pain (0-10):  Pt reports  Current Pain Management:  [ ] PCA   [x ] Po Analgesics [ ] IM /IV Anagesics     T(F): 97.5  HR: 53  BP: 101/54  RR: 18  SpO2: 98%                         10.2   11.43 )-----------( 194      ( 04 Oct 2023 06:30 )             29.7         10-04    131<L>  |  98  |  19  ----------------------------<  150<H>  4.4   |  29  |  0.62    Ca    8.7      04 Oct 2023 06:30      Physical Exam :    -  Dressing C/D/I.   -   Distal Neurvascular status intact grossly.   -   Warm well perfused; capillary refill <3 seconds   -   (+)EHL/FHL   -   (+) Sensation to light touch  -   (-) Calf tenderness Bilaterally    A/P: 73y Female s/p Knee replacement  Right       -   Ortho Stable  -   Pain control   -   Medicine to follow  -   DVT ppx:     [ ]SCDs     [x ] ASA     [ ] Eliquis     [ ] Lovenox  -   Weight bearing status:  WBAT [x ]        PWB    [ ]     TTWB  [ ]      NWB  [ ]   -  Dispo:     Home [x ]     Acute Rehab [ ]     FREDI [ ]     TBD [ ]
POST OPERATIVE DAY #: 1    73y Female  s/p    Right  TKA                   SUBJECTIVE: Patient seen and examined at bedside. Pt complaining of radiating pain down her right side as well as down for right leg. states she has radicular pain preop     Pain:  well controlled   Pain scale:   3/10  Denies CP, SOB, N/V/D, weakness, numbness   No new complains     OBJECTIVE:     Vital Signs Last 24 Hrs  T(C): 36.3 (04 Oct 2023 03:30), Max: 36.7 (03 Oct 2023 20:00)  T(F): 97.4 (04 Oct 2023 03:30), Max: 98 (03 Oct 2023 20:00)  HR: 55 (04 Oct 2023 03:30) (55 - 74)  BP: 114/64 (04 Oct 2023 03:30) (96/61 - 129/62)  BP(mean): 61 (03 Oct 2023 16:15) (57 - 65)  RR: 16 (04 Oct 2023 03:30) (11 - 18)  SpO2: 98% (04 Oct 2023 03:30) (96% - 100%)    Parameters below as of 04 Oct 2023 03:30  Patient On (Oxygen Delivery Method): room air        Affected extremity: right         Dressing:  mipilex clean/dry/intact                   Sensation: intact to light touch          Motor exam:  5 / 5 Tibialis Anterior/Gastrocnemius-Soleus, EHL/FHL         warm, well-perfused; capillary refill < 3 seconds         negative calf tenderness B/L LE    LABS:                        10.2   11.43 )-----------( 194      ( 04 Oct 2023 06:30 )             29.7               MEDICATIONS:    Pain management:  acetaminophen     Tablet .. 1000 milliGRAM(s) Oral every 8 hours  celecoxib 200 milliGRAM(s) Oral every 12 hours  HYDROmorphone  Injectable 0.5 milliGRAM(s) IV Push every 3 hours PRN  ondansetron Injectable 4 milliGRAM(s) IV Push every 6 hours PRN  oxyCODONE    IR 10 milliGRAM(s) Oral every 3 hours PRN  oxyCODONE    IR 5 milliGRAM(s) Oral every 3 hours PRN    DVT prophylaxis:   apixaban 2.5 milliGRAM(s) Oral every 12 hours      RADIOLOGY & ADDITIONAL STUDIES:    ASSESSMENT AND PLAN:   - cryocuff  - Analgesic pain control as needed  - DVT prophylaxis:       Eliquis2.5mg twice a day  SCDs       - Pain menagement: Celebrex 200mg twice a day x 21   - PT/OT: Weight Bearing Status:  Weight bearing as tolerated, OOBTC       -  Incentive spirometry  - Advance diet as tolerated  - Hospitalist is following  -  Follow up labs  -  Disposition: Home today if clears PT/OT
Discharge medication calendar:  Eliquis 2.5mg q12h x 2 weeks then ASA EC 81mg q12h x 2 weeks  Omeprazole 20mg QAM x 4 weeks  APAP 1000mg q8h x 2-3 weeks  Celecoxib 200mg q12h x 2-3 weeks  Narcotic PRN  Docusate 100mg TID while taking narcotic  Miralax, Senna, or Bisacodyl PRN for treatment of constipation  
Subjective: Patient seen and examined. Doing well with no overnight events.     MEDICATIONS  (STANDING):  acetaminophen     Tablet .. 1000 milliGRAM(s) Oral every 8 hours  apixaban 2.5 milliGRAM(s) Oral every 12 hours  celecoxib 200 milliGRAM(s) Oral every 12 hours  lactated ringers. 1000 milliLiter(s) (100 mL/Hr) IV Continuous <Continuous>  pantoprazole    Tablet 40 milliGRAM(s) Oral before breakfast  polyethylene glycol 3350 17 Gram(s) Oral at bedtime  senna 2 Tablet(s) Oral at bedtime    MEDICATIONS  (PRN):  HYDROmorphone  Injectable 0.5 milliGRAM(s) IV Push every 3 hours PRN Breakthrough pain  magnesium hydroxide Suspension 30 milliLiter(s) Oral daily PRN Constipation  ondansetron Injectable 4 milliGRAM(s) IV Push every 6 hours PRN Nausea and/or Vomiting  oxyCODONE    IR 5 milliGRAM(s) Oral every 3 hours PRN Moderate Pain (4 - 6)  oxyCODONE    IR 10 milliGRAM(s) Oral every 3 hours PRN Severe Pain (7 - 10)      Allergies    Effexor (Other)    Intolerances        Vital Signs Last 24 Hrs  T(C): 36.4 (04 Oct 2023 07:58), Max: 36.7 (03 Oct 2023 20:00)  T(F): 97.5 (04 Oct 2023 07:58), Max: 98 (03 Oct 2023 20:00)  HR: 53 (04 Oct 2023 07:58) (53 - 74)  BP: 101/54 (04 Oct 2023 07:58) (96/61 - 115/58)  BP(mean): 61 (03 Oct 2023 16:15) (57 - 65)  RR: 18 (04 Oct 2023 07:58) (11 - 18)  SpO2: 98% (04 Oct 2023 07:58) (96% - 100%)    Parameters below as of 04 Oct 2023 07:58  Patient On (Oxygen Delivery Method): room air        PHYSICAL EXAM:  GENERAL: NAD, well-groomed, well-developed  HEAD:  Atraumatic, Normocephalic  ENMT: Moist mucous membranes,   NECK: Supple, No JVD, Normal thyroid  NERVOUS SYSTEM:  All 4 extremities mobile, no gross sensory deficits.   CHEST/LUNG: Clear to auscultation bilaterally; No rales, rhonchi, wheezing, or rubs  HEART: Regular rate and rhythm; No murmurs, rubs, or gallops  ABDOMEN: Soft, Nontender, Nondistended; Bowel sounds present  EXTREMITIES:  2+ Peripheral Pulses, No clubbing, cyanosis, or edema      LABS:                        10.2   11.43 )-----------( 194      ( 04 Oct 2023 06:30 )             29.7     04 Oct 2023 06:30    131    |  98     |  19     ----------------------------<  150    4.4     |  29     |  0.62     Ca    8.7        04 Oct 2023 06:30        Urinalysis Basic - ( 04 Oct 2023 06:30 )    Color: x / Appearance: x / SG: x / pH: x  Gluc: 150 mg/dL / Ketone: x  / Bili: x / Urobili: x   Blood: x / Protein: x / Nitrite: x   Leuk Esterase: x / RBC: x / WBC x   Sq Epi: x / Non Sq Epi: x / Bacteria: x      CAPILLARY BLOOD GLUCOSE          RADIOLOGY & ADDITIONAL TESTS:    Imaging Personally Reviewed:  [ ] YES     Consultant(s) Notes Reviewed:      Care Discussed with Consultants/Other Providers:    Advanced Directives: [ ] DNR  [ ] No feeding tube  [ ] MOLST in chart  [ ] MOLST completed today  [ ] Unknown

## 2023-10-04 NOTE — DISCHARGE NOTE NURSING/CASE MANAGEMENT/SOCIAL WORK - NSSCNAMETXT_GEN_ALL_CORE
[facility] care agency [phone] will reach out to you within 24-72 hours of your discharge to schedule home care visit/eval appointment with you. Please call agency for any queries regarding home care services

## 2023-10-04 NOTE — DISCHARGE NOTE NURSING/CASE MANAGEMENT/SOCIAL WORK - NSDCPEELIQUISREACT_GEN_ALL_CORE
Patient:   MARTÍN SMITH            MRN: [id number]            FIN: [id number]               Age:   9 months     Sex:  Female     :  2016   Associated Diagnoses:   None   Author:   Cal Meeks PA-C      Basic Information   Addendum: Time of addendum:: 2018 20:08:00 .      Medical Decision Making   Notes:  Call back.  Doing well.  Very happy with visit..        Apixaban/Eliquis increases your risk for bleeding. Notify your doctor if you experience any of the following side effects: bleeding, coughing or vomiting blood, red or black stool, unexpected pain or swelling, itching or hives, chest pain, chest tightness, trouble breathing, changes in how much or how often you urinate, red or pink urine, numbness or tingling in your feet, or unusual muscle weakness. When Apixaban/Eliquis is taken with other medicines, they can affect how it works. Taking other medications such as aspirin, blood thinners, nonsteroidal anti-inflammatories, and medications that treat depression can increase your risk of bleeding. It is very important to tell your health care provider about all of the other medicines, including over-the-counter medications, herbs, and vitamins you are taking. DO NOT start, stop, or change the dosage of any medicine, including over-the-counter medicines, vitamins, and herbal products without your doctor’s approval. Any products containing aspirin or are nonsteroidal anti-inflammatories lessen the blood’s ability to form clots and add to the effect of Apixaban/Eliquis. Never take aspirin or medicines that contain aspirin without speaking to your doctor.

## 2023-10-04 NOTE — PROGRESS NOTE ADULT - ASSESSMENT
73F HTN, and OA admitted for aftercare following Right TKR    S/P Right TKR  POD 1   Continue Bowel and pain control regimen;    Incentive Spirometer for lung expansion;   Monitor Hgb and follow up electrolytes.      HTN  Controlled for now  Hold ARB and HCTZ until POD 6  Patient BP has remained soft during her stay and advised her to continue monitoring at home    Diet  Regular    DVT Prophylaxis   Aspirin BID    Disposition  Discharge planning pending hospital course

## 2023-10-04 NOTE — DISCHARGE NOTE NURSING/CASE MANAGEMENT/SOCIAL WORK - PATIENT PORTAL LINK FT
You can access the FollowMyHealth Patient Portal offered by [facility] by registering at the following website: [email or web address]. By joining [facility]’s FollowMyHealth portal, you will also be able to view your health information using other applications (apps) compatible with our system.

## 2023-10-05 ENCOUNTER — TRANSCRIPTION ENCOUNTER (OUTPATIENT)
Age: 73
End: 2023-10-05

## 2023-10-18 ENCOUNTER — APPOINTMENT (OUTPATIENT)
Dept: ORTHOPEDIC SURGERY | Facility: CLINIC | Age: 73
End: 2023-10-18

## 2023-10-19 ENCOUNTER — APPOINTMENT (OUTPATIENT)
Dept: ORTHOPEDIC SURGERY | Facility: CLINIC | Age: 73
End: 2023-10-19
Payer: MEDICARE

## 2023-10-19 DIAGNOSIS — E66.9 OBESITY, UNSPECIFIED: ICD-10-CM

## 2023-10-19 PROCEDURE — 99213 OFFICE O/P EST LOW 20 MIN: CPT | Mod: 24

## 2023-10-19 PROCEDURE — 73560 X-RAY EXAM OF KNEE 1 OR 2: CPT | Mod: RT

## 2023-11-20 ENCOUNTER — APPOINTMENT (OUTPATIENT)
Dept: ORTHOPEDIC SURGERY | Facility: CLINIC | Age: 73
End: 2023-11-20
Payer: MEDICARE

## 2023-11-20 VITALS — HEIGHT: 60 IN | BODY MASS INDEX: 38.28 KG/M2 | WEIGHT: 195 LBS

## 2023-11-20 DIAGNOSIS — M47.816 SPONDYLOSIS W/OUT MYELOPATHY OR RADICULOPATHY, LUMBAR REGION: ICD-10-CM

## 2023-11-20 PROCEDURE — 99213 OFFICE O/P EST LOW 20 MIN: CPT | Mod: 24

## 2023-11-20 PROCEDURE — 73562 X-RAY EXAM OF KNEE 3: CPT | Mod: RT

## 2023-12-04 RX ORDER — CEPHALEXIN 500 MG/1
500 TABLET ORAL
Qty: 10 | Refills: 0 | Status: ACTIVE | COMMUNITY
Start: 2021-11-29 | End: 1900-01-01

## 2023-12-30 ENCOUNTER — NON-APPOINTMENT (OUTPATIENT)
Age: 73
End: 2023-12-30

## 2024-01-18 ENCOUNTER — APPOINTMENT (OUTPATIENT)
Dept: ORTHOPEDIC SURGERY | Facility: CLINIC | Age: 74
End: 2024-01-18
Payer: MEDICARE

## 2024-01-18 VITALS — WEIGHT: 195 LBS | HEIGHT: 60 IN | BODY MASS INDEX: 38.28 KG/M2

## 2024-01-18 DIAGNOSIS — M76.31 ILIOTIBIAL BAND SYNDROME, RIGHT LEG: ICD-10-CM

## 2024-01-18 PROCEDURE — 99213 OFFICE O/P EST LOW 20 MIN: CPT

## 2024-01-18 NOTE — ADDENDUM
[FreeTextEntry1] : I, TAMARA PALACIOS, acted solely as a scribe for Dr. Brodie Patel on this date 01/18/2024.  All medical record entries made by the Scribe were at my, Dr. Brodie Patel, direction and personally dictated by me on 01/18/2024. I have reviewed the chart and agree that the record accurately reflects my personal performance of the history, physical exam, assessment and plan. I have also personally directed, reviewed, and agreed with the chart.

## 2024-01-18 NOTE — DISCUSSION/SUMMARY
[de-identified] : I went over the pathophysiology of the patient's symptoms in great detail with the patient. I discussed the underlying pathophysiology of the patient's condition in great detail with the patient. I went over the patient's x-rays with them in great detail. At this time, she will start a course of physical therapy for strengthening and flexibility. A prescription was provided. We discussed the use of ice, Tylenol and anti-inflammatories to relieve pain. I warned her to be very careful when it becomes slippery and icy outside because a fall could result in serious consequences. Proper cane walking protocol was discussed and demonstrated with the patient.   All of their questions were answered. They understand and consent to the plan.   FU in 6 weeks she will get a right knee X-ray upon her return.

## 2024-01-18 NOTE — PHYSICAL EXAM
[LE] : Sensory: Intact in bilateral lower extremities [de-identified] : Constitutional o Appearance : well-nourished, well developed, alert, in no acute distress  Head and Face o Head :  Inspection : atraumatic, normocephalic o Face :  Inspection : no visible rash or discoloration Respiratory o Respiratory Effort: breathing unlabored  Neurologic o Mental Status Examination :  Orientation : alert and oriented X 3 Psychiatric o Mood and Affect: mood normal, affect appropriate Cardiovascular o Observation/Palpation : - no swelling Lymphatic o Additional Nodes : No palpable lymph nodes present  Lumbosacral Spine o Inspection : no visible rash or discoloration o Palpation : no paraspinal musculature tenderness o Range of Motion : extension causes right paralumbar discomfort, rotation to the right causes mild discomfort  o Muscle Strength : paraspinal muscle strength and tone within normal limits o Muscle Tone : paraspinal muscle strength and tone within normal limits o Tests: straight leg test negative and ANDRE test negative bilaterally   Right Lower Extremity o Buttock : no tenderness, swelling or deformities  o Right Hip :    - Inspection/Palpation :  tenderness over the ITB, no swelling or deformities    - Range of Motion : full and painless in all planes, no crepitance    - Stability : joint stability intact    - Strength : extension, flexion, adduction, abduction, internal rotation and external rotation, 4+/5    - Tests: Brian's test negative  o Right Knee :  Inspection/Palpation : tenderness over the lateral compartment, Incisions are well-healed, can SLR, no swelling, excellent alignment   Range of Motion : 0-115  full extension, good patellofemoral glide,   Stability : no valgus or varus instability present on provocative testing  Strength : flexion and extension 4/5   Tests and Signs : negative Anterior Drawer, negative Lachman, negative Nick  Left Lower Extremity o Buttock : no tenderness, swelling or deformities  o Left Hip :    - Inspection/Palpation : no tenderness, no swelling or deformities    - Range of Motion : full and painless in all planes, no crepitance    - Stability : joint stability intact    - Strength : extension, flexion, adduction, abduction, internal rotation and external rotation, 5/5      - Tests: Brian's test negative  o Left Knee :  Inspection/Palpation : no tenderness to palpation, no swelling, incision well-healed  Range of Motion : 0-112, no crepitance, good patellofemoral glide  Stability : no valgus or varus instability present on provocative testing  Strength : flexion and extension 5/5, can SLR, no extensor lag  Tests and Signs : negative Anterior Drawer  o Peripheral Vascular System :  Dorsalis Pedis Artery : pulse 2+ bilaterally  Posterior Tibial Artery : pulse 2+ bilaterally   Gait and Station: Gait: heel/toe on the left, ambulating without a cane, no significant extremity swelling or lymphedema, good proprioception and balance,

## 2024-01-18 NOTE — HISTORY OF PRESENT ILLNESS
[de-identified] : The patient is a 73-year-old female who returns for the 3rd postoperative visit after undergoing a right TKR on 10/3/2023. The patient is recovering at home. She reports mild postoperative pain. She states her right knee pain is mostly laterally. She just finished at-home PT. She states she has not gone to outpatient PT at this time but she thinks she is ready to begin.. She is not using a cane at this time. She states she does not have difficulty with stairs. She is 2 years s/p left TKR done on 6/29/21 and is thrilled with her result.    Radiology Results 11/20/2023 o Right Knee : Standing AP, lateral and tunnel views of the knee were obtained and revealed excellent position of TKR with central tracking of the patella  Radiology Results 10/19/2023 o Right Knee : Standing AP, lateral and tunnel views of the knee were obtained and revealed excellent position of TKR  Radiology Results 09/27/2023 o Right Knee : Standing AP, lateral and tunnel views of the knee were obtained and revealed bone on bone in the lateral compartment with moderate patellofemoral arthritis. THESE ARE TEMPLATED FILMS  Radiology Results 05/17/2023 o Lumbosacral Spine : AP and lateral views were obtained and revealed grade 1 spondylolisthesis of L5 on S1 with diffuse degenerative disc disease worse at T11-12 and L2-3.  o Pelvis : AP pelvis was obtained and revealed minimal degenerative arthritis of both hips. o Right Knee : Standing AP, lateral, tunnel, and skyline views of the knee were obtained and revealed bone on bone in the lateral compartment with moderate patellofemoral arthritis.  o Left Knee : Standing AP, Lateral and skyline views of the knee were obtained and revealed excellent position of her left total knee replacement with central tracking of the patella. No sign of loosening or subsidence.

## 2024-02-21 ENCOUNTER — TRANSCRIPTION ENCOUNTER (OUTPATIENT)
Age: 74
End: 2024-02-21

## 2024-02-23 ENCOUNTER — TRANSCRIPTION ENCOUNTER (OUTPATIENT)
Age: 74
End: 2024-02-23

## 2024-03-05 ENCOUNTER — APPOINTMENT (OUTPATIENT)
Dept: ORTHOPEDIC SURGERY | Facility: CLINIC | Age: 74
End: 2024-03-05
Payer: MEDICARE

## 2024-03-05 VITALS — HEIGHT: 60 IN | WEIGHT: 201 LBS | BODY MASS INDEX: 39.46 KG/M2

## 2024-03-05 DIAGNOSIS — S83.90XA SPRAIN OF UNSPECIFIED SITE OF UNSPECIFIED KNEE, INITIAL ENCOUNTER: ICD-10-CM

## 2024-03-05 PROCEDURE — 73562 X-RAY EXAM OF KNEE 3: CPT | Mod: RT

## 2024-03-05 PROCEDURE — 99214 OFFICE O/P EST MOD 30 MIN: CPT

## 2024-03-05 NOTE — PHYSICAL EXAM
[LE] : Sensory: Intact in bilateral lower extremities [de-identified] : Constitutional o Appearance : well-nourished, well developed, alert, in no acute distress  Head and Face o Head :  Inspection : atraumatic, normocephalic o Face :  Inspection : no visible rash or discoloration Respiratory o Respiratory Effort: breathing unlabored  Neurologic o Mental Status Examination :  Orientation : alert and oriented X 3 Psychiatric o Mood and Affect: mood normal, affect appropriate Cardiovascular o Observation/Palpation : - no swelling Lymphatic o Additional Nodes : No palpable lymph nodes present  Lumbosacral Spine o Inspection : no visible rash or discoloration o Palpation : no paraspinal musculature tenderness o Range of Motion : extension causes right paralumbar discomfort, rotation to the right causes mild discomfort  o Muscle Strength : paraspinal muscle strength and tone within normal limits o Muscle Tone : paraspinal muscle strength and tone within normal limits o Tests: straight leg test negative and ANDRE test negative bilaterally   Right Lower Extremity o Buttock : no tenderness, swelling or deformities  o Right Hip :    - Inspection/Palpation :  tenderness over the ITB, no swelling or deformities    - Range of Motion : full and painless in all planes, no crepitance    - Stability : joint stability intact    - Strength : extension, flexion, adduction, abduction, internal rotation and external rotation, 4+/5    - Tests: Brian's test negative  o Right Knee :  Inspection/Palpation : tenderness over the lateral compartment, Incisions are well-healed, can SLR, no swelling, excellent alignment   Range of Motion : 0-115  full extension, good patellofemoral glide,   Stability : no valgus or varus instability present on provocative testing  Strength : flexion and extension 4/5 , no extensor lag,   Tests and Signs : negative Anterior Drawer, negative Lachman, negative Nick  Left Lower Extremity o Buttock : no tenderness, swelling or deformities  o Left Hip :    - Inspection/Palpation : no tenderness, no swelling or deformities    - Range of Motion : full and painless in all planes, no crepitance    - Stability : joint stability intact    - Strength : extension, flexion, adduction, abduction, internal rotation and external rotation, 5/5      - Tests: Brian's test negative  o Left Knee :  Inspection/Palpation : no tenderness to palpation, no swelling, incision well-healed  Range of Motion : 0-112, no crepitance, good patellofemoral glide  Stability : no valgus or varus instability present on provocative testing  Strength : flexion and extension 5/5, can SLR, no extensor lag  Tests and Signs : negative Anterior Drawer  o Peripheral Vascular System :  Dorsalis Pedis Artery : pulse 2+ bilaterally  Posterior Tibial Artery : pulse 2+ bilaterally   Gait and Station: Gait: heel/toe on the left, ambulating without a cane, no significant extremity swelling or lymphedema, good proprioception and balance,   Radiology Results 3/5/2024 o Right Knee : Standing AP, lateral and skyline views of the knee were obtained and revealed excellent position of the total knee replacement with central tracking of the patella.

## 2024-03-05 NOTE — DISCUSSION/SUMMARY
[de-identified] : I went over the pathophysiology of the patient's symptoms in great detail with the patient. I discussed the underlying pathophysiology of the patient's condition in great detail with the patient. I went over the patient's x-rays with them in great detail. At this time, she will start a course of physical therapy for strengthening and flexibility. A prescription was provided. We discussed the use of ice, Tylenol and anti-inflammatories to relieve pain.   All of their questions were answered. They understand and consent to the plan.   FU in 6 weeks.

## 2024-03-05 NOTE — HISTORY OF PRESENT ILLNESS
[de-identified] : The patient is a 73-year-old female who returns for the 4th postoperative visit after undergoing a right TKR on 10/3/2023. The patient is recovering at home. She reports mild postoperative pain. She states her right leg caught in an undertow in [facility] which aggravated her right knee. She states her right knee was sore for a couple of days. She stats she avoided the water and pool after the incident. She states she continue to do home exercises while on vacation. She is ambulating without a cane at this time. She would like to go back to physical therapy. She is 3 years s/p left TKR done on 6/29/21 and is thrilled with her result.    Radiology Results 11/20/2023 o Right Knee : Standing AP, lateral and tunnel views of the knee were obtained and revealed excellent position of TKR with central tracking of the patella  Radiology Results 10/19/2023 o Right Knee : Standing AP, lateral and tunnel views of the knee were obtained and revealed excellent position of TKR  Radiology Results 09/27/2023 o Right Knee : Standing AP, lateral and tunnel views of the knee were obtained and revealed bone on bone in the lateral compartment with moderate patellofemoral arthritis. THESE ARE TEMPLATED FILMS  Radiology Results 05/17/2023 o Lumbosacral Spine : AP and lateral views were obtained and revealed grade 1 spondylolisthesis of L5 on S1 with diffuse degenerative disc disease worse at T11-12 and L2-3.  o Pelvis : AP pelvis was obtained and revealed minimal degenerative arthritis of both hips. o Right Knee : Standing AP, lateral, tunnel, and skyline views of the knee were obtained and revealed bone on bone in the lateral compartment with moderate patellofemoral arthritis.  o Left Knee : Standing AP, Lateral and skyline views of the knee were obtained and revealed excellent position of her left total knee replacement with central tracking of the patella. No sign of loosening or subsidence.

## 2024-03-05 NOTE — ADDENDUM
[FreeTextEntry1] : I, TAMARA PALACIOS, acted solely as a scribe for Dr. Brodie Patel on this date 03/05/2024.  All medical record entries made by the Scribe were at my, Dr. Brodie Patel, direction and personally dictated by me on 03/05/2024. I have reviewed the chart and agree that the record accurately reflects my personal performance of the history, physical exam, assessment and plan. I have also personally directed, reviewed, and agreed with the chart.

## 2024-04-16 ENCOUNTER — APPOINTMENT (OUTPATIENT)
Dept: ORTHOPEDIC SURGERY | Facility: CLINIC | Age: 74
End: 2024-04-16
Payer: MEDICARE

## 2024-04-16 DIAGNOSIS — M43.17 SPONDYLOLISTHESIS, LUMBOSACRAL REGION: ICD-10-CM

## 2024-04-16 DIAGNOSIS — M48.061 SPINAL STENOSIS, LUMBAR REGION WITHOUT NEUROGENIC CLAUDICATION: ICD-10-CM

## 2024-04-16 PROCEDURE — 99214 OFFICE O/P EST MOD 30 MIN: CPT

## 2024-04-16 RX ORDER — CEPHALEXIN 500 MG/1
500 TABLET ORAL
Qty: 10 | Refills: 0 | Status: ACTIVE | COMMUNITY
Start: 2024-04-16 | End: 1900-01-01

## 2024-04-16 NOTE — HISTORY OF PRESENT ILLNESS
[de-identified] : The patient is a 73-year-old female who returns for a postoperative visit after undergoing a right TKR on 10/3/2023. The patient is recovering at home. She reports mild postoperative pain. She states she has good days and bad days. She states lower back pain after physical therapy. She states she had lower back pain before the surgery. She notes she think she is making progress in physical therapy. She denies any swelling or buckling. she states they are not forcefully manipulating the right knee. She is 3 years s/p left TKR done on 6/29/21 and is thrilled with her result.    Radiology Results 3/5/2024 o Right Knee : Standing AP, lateral and skyline views of the knee were obtained and revealed excellent position of the total knee replacement with central tracking of the patella.   Radiology Results 11/20/2023 o Right Knee : Standing AP, lateral and tunnel views of the knee were obtained and revealed excellent position of TKR with central tracking of the patella  Radiology Results 10/19/2023 o Right Knee : Standing AP, lateral and tunnel views of the knee were obtained and revealed excellent position of TKR  Radiology Results 09/27/2023 o Right Knee : Standing AP, lateral and tunnel views of the knee were obtained and revealed bone on bone in the lateral compartment with moderate patellofemoral arthritis. THESE ARE TEMPLATED FILMS  Radiology Results 05/17/2023 o Lumbosacral Spine : AP and lateral views were obtained and revealed grade 1 spondylolisthesis of L5 on S1 with diffuse degenerative disc disease worse at T11-12 and L2-3.  o Pelvis : AP pelvis was obtained and revealed minimal degenerative arthritis of both hips. o Right Knee : Standing AP, lateral, tunnel, and skyline views of the knee were obtained and revealed bone on bone in the lateral compartment with moderate patellofemoral arthritis.  o Left Knee : Standing AP, Lateral and skyline views of the knee were obtained and revealed excellent position of her left total knee replacement with central tracking of the patella. No sign of loosening or subsidence.

## 2024-04-16 NOTE — ADDENDUM
[FreeTextEntry1] : I, TAMARA PALACIOS, acted solely as a scribe for Dr. Brodie Patel on this date 04/16/2024.  All medical record entries made by the Scribe were at my, Dr. Brodie Patel, direction and personally dictated by me on 04/16/2024. I have reviewed the chart and agree that the record accurately reflects my personal performance of the history, physical exam, assessment and plan. I have also personally directed, reviewed, and agreed with the chart.

## 2024-04-16 NOTE — PHYSICAL EXAM
[LE] : Sensory: Intact in bilateral lower extremities [de-identified] : Constitutional o Appearance : well-nourished, well developed, alert, in no acute distress  Head and Face o Head :  Inspection : atraumatic, normocephalic o Face :  Inspection : no visible rash or discoloration Respiratory o Respiratory Effort: breathing unlabored  Neurologic o Mental Status Examination :  Orientation : alert and oriented X 3 Psychiatric o Mood and Affect: mood normal, affect appropriate Cardiovascular o Observation/Palpation : - no swelling Lymphatic o Additional Nodes : No palpable lymph nodes present  Lumbosacral Spine o Inspection : no visible rash or discoloration o Palpation : no paraspinal musculature tenderness o Range of Motion : extension causes right paralumbar discomfort, rotation to the right causes mild discomfort  o Muscle Strength : paraspinal muscle strength and tone within normal limits o Muscle Tone : paraspinal muscle strength and tone within normal limits o Tests: straight leg test negative and ANDRE test negative bilaterally   Right Lower Extremity o Buttock : no tenderness, swelling or deformities  o Right Hip :    - Inspection/Palpation :  tenderness over the ITB, no swelling or deformities    - Range of Motion : full and painless in all planes, no crepitance    - Stability : joint stability intact    - Strength : extension, flexion, adduction, abduction, internal rotation and external rotation, 4+/5    - Tests: Brian's test negative  o Right Knee :  Inspection/Palpation : tenderness over the lateral compartment, Incisions are well-healed, SLR negative, no swelling, excellent alignment   Range of Motion : 0-114 full extension, good patellofemoral glide,   Stability : no valgus or varus instability present on provocative testing  Strength : flexion and extension 5/5 , no extensor lag,   Tests and Signs : negative Anterior Drawer, negative Lachman, negative Nick  Left Lower Extremity o Buttock : no tenderness, swelling or deformities  o Left Hip :    - Inspection/Palpation : no tenderness, no swelling or deformities    - Range of Motion : full and painless in all planes, no crepitance    - Stability : joint stability intact    - Strength : extension, flexion, adduction, abduction, internal rotation and external rotation, 5/5      - Tests: Brian's test negative  o Left Knee :  Inspection/Palpation : no tenderness to palpation, no swelling, incision well-healed  Range of Motion : 0-112, no crepitance, good patellofemoral glide  Stability : no valgus or varus instability present on provocative testing  Strength : flexion and extension 5/5, can SLR, no extensor lag  Tests and Signs : negative Anterior Drawer  o Peripheral Vascular System :  Dorsalis Pedis Artery : pulse 2+ bilaterally  Posterior Tibial Artery : pulse 2+ bilaterally   Gait and Station: Gait: heel/toe on the left, ambulating without a cane, no significant extremity swelling or lymphedema, good proprioception and balance,

## 2024-04-16 NOTE — DISCUSSION/SUMMARY
[de-identified] : I went over the pathophysiology of the patient's symptoms in great detail with the patient. I am recommending the patient goes to her last physical therapy session to obtain an HEP. A note was provided. We discussed the use of ice, Tylenol and anti-inflammatories to relieve pain. I stressed the importance of weight loss and its benefits to the patients joints and overall health.   All of their questions were answered. They understand and consent to the plan.   FU in one month. If she does not improve, we will get lumbar spine X-rays upon her return.  We will get bilateral x-rays of her knees as well on the next visit

## 2024-04-26 ENCOUNTER — APPOINTMENT (OUTPATIENT)
Dept: GASTROENTEROLOGY | Facility: CLINIC | Age: 74
End: 2024-04-26
Payer: MEDICARE

## 2024-04-26 VITALS
DIASTOLIC BLOOD PRESSURE: 77 MMHG | HEIGHT: 60 IN | WEIGHT: 204 LBS | OXYGEN SATURATION: 97 % | BODY MASS INDEX: 40.05 KG/M2 | HEART RATE: 83 BPM | SYSTOLIC BLOOD PRESSURE: 116 MMHG

## 2024-04-26 DIAGNOSIS — K20.90 ESOPHAGITIS, UNSPECIFIED WITHOUT BLEEDING: ICD-10-CM

## 2024-04-26 DIAGNOSIS — Z12.11 ENCOUNTER FOR SCREENING FOR MALIGNANT NEOPLASM OF COLON: ICD-10-CM

## 2024-04-26 DIAGNOSIS — R10.13 EPIGASTRIC PAIN: ICD-10-CM

## 2024-04-26 PROCEDURE — 99214 OFFICE O/P EST MOD 30 MIN: CPT

## 2024-04-26 NOTE — ASSESSMENT
[FreeTextEntry1] : Patient with change in bowel habits, will schedule colonoscopy [facility]

## 2024-06-18 ENCOUNTER — APPOINTMENT (OUTPATIENT)
Dept: ORTHOPEDIC SURGERY | Facility: CLINIC | Age: 74
End: 2024-06-18
Payer: MEDICARE

## 2024-06-18 VITALS — HEIGHT: 60 IN | WEIGHT: 206 LBS | BODY MASS INDEX: 40.44 KG/M2

## 2024-06-18 DIAGNOSIS — M17.0 BILATERAL PRIMARY OSTEOARTHRITIS OF KNEE: ICD-10-CM

## 2024-06-18 DIAGNOSIS — Z96.651 PRESENCE OF RIGHT ARTIFICIAL KNEE JOINT: ICD-10-CM

## 2024-06-18 PROCEDURE — 73562 X-RAY EXAM OF KNEE 3: CPT | Mod: 50

## 2024-06-18 PROCEDURE — 99213 OFFICE O/P EST LOW 20 MIN: CPT

## 2024-06-25 ENCOUNTER — APPOINTMENT (OUTPATIENT)
Dept: GASTROENTEROLOGY | Facility: GI CENTER | Age: 74
End: 2024-06-25
Payer: MEDICARE

## 2024-06-25 ENCOUNTER — RESULT REVIEW (OUTPATIENT)
Age: 74
End: 2024-06-25

## 2024-06-25 PROCEDURE — 43239 EGD BIOPSY SINGLE/MULTIPLE: CPT

## 2024-06-25 PROCEDURE — 45380 COLONOSCOPY AND BIOPSY: CPT

## 2024-07-18 ENCOUNTER — APPOINTMENT (OUTPATIENT)
Dept: GASTROENTEROLOGY | Facility: CLINIC | Age: 74
End: 2024-07-18
Payer: MEDICARE

## 2024-07-18 VITALS
BODY MASS INDEX: 40.64 KG/M2 | OXYGEN SATURATION: 97 % | DIASTOLIC BLOOD PRESSURE: 81 MMHG | WEIGHT: 207 LBS | SYSTOLIC BLOOD PRESSURE: 125 MMHG | HEIGHT: 60 IN | HEART RATE: 84 BPM

## 2024-07-18 DIAGNOSIS — Z87.19 PERSONAL HISTORY OF OTHER DISEASES OF THE DIGESTIVE SYSTEM: ICD-10-CM

## 2024-07-18 DIAGNOSIS — K20.90 ESOPHAGITIS, UNSPECIFIED WITHOUT BLEEDING: ICD-10-CM

## 2024-07-18 DIAGNOSIS — K44.9 DIAPHRAGMATIC HERNIA W/OUT OBSTRUCTION OR GANGRENE: ICD-10-CM

## 2024-07-18 PROCEDURE — 99213 OFFICE O/P EST LOW 20 MIN: CPT

## 2024-07-19 PROBLEM — K44.9 HIATAL HERNIA: Status: ACTIVE | Noted: 2024-07-19

## 2024-07-19 PROBLEM — Z87.19 HISTORY OF ESOPHAGITIS: Status: RESOLVED | Noted: 2024-04-26 | Resolved: 2024-07-19

## 2024-07-19 PROBLEM — K20.90 ESOPHAGITIS: Status: ACTIVE | Noted: 2024-07-19

## 2024-08-02 ENCOUNTER — APPOINTMENT (OUTPATIENT)
Dept: GASTROENTEROLOGY | Facility: HOSPITAL | Age: 74
End: 2024-08-02

## 2024-09-19 ENCOUNTER — APPOINTMENT (OUTPATIENT)
Dept: ORTHOPEDIC SURGERY | Facility: CLINIC | Age: 74
End: 2024-09-19
Payer: MEDICARE

## 2024-09-19 VITALS — WEIGHT: 207 LBS | BODY MASS INDEX: 40.64 KG/M2 | HEIGHT: 60 IN

## 2024-09-19 DIAGNOSIS — E66.9 OBESITY, UNSPECIFIED: ICD-10-CM

## 2024-09-19 DIAGNOSIS — Z96.652 PRESENCE OF LEFT ARTIFICIAL KNEE JOINT: ICD-10-CM

## 2024-09-19 PROCEDURE — 99213 OFFICE O/P EST LOW 20 MIN: CPT

## 2024-09-19 NOTE — PHYSICAL EXAM
[LE] : Sensory: Intact in bilateral lower extremities [de-identified] : Constitutional o Appearance : well-nourished, well developed, alert, in no acute distress  Head and Face o Head :  Inspection : atraumatic, normocephalic o Face :  Inspection : no visible rash or discoloration Respiratory o Respiratory Effort: breathing unlabored  Neurologic o Mental Status Examination :  Orientation : alert and oriented X 3 Psychiatric o Mood and Affect: mood normal, affect appropriate Cardiovascular o Observation/Palpation : - no swelling Lymphatic o Additional Nodes : No palpable lymph nodes present  Lumbosacral Spine o Inspection : no visible rash or discoloration o Palpation : no paraspinal musculature tenderness o Range of Motion : extension causes right paralumbar discomfort, rotation to the right causes mild discomfort  o Muscle Strength : paraspinal muscle strength and tone within normal limits o Muscle Tone : paraspinal muscle strength and tone within normal limits o Tests: straight leg test negative and ANDRE test negative bilaterally   Right Lower Extremity o Buttock : no tenderness, swelling or deformities  o Right Hip :    - Inspection/Palpation :  no tenderness , no swelling or deformities    - Range of Motion : full and painless in all planes, no crepitance    - Stability : joint stability intact    - Strength : extension, flexion, adduction, abduction, internal rotation and external rotation, 4+/5    - Tests: Brian's test negative  o Right Knee :  Inspection/Palpation : tenderness over the lateral compartment, Incisions are well-healed, SLR negative, no swelling, excellent alignment   Range of Motion : 0-110, good patellofemoral glide,   Stability : no valgus or varus instability present on provocative testing  Strength : flexion and extension 5/5, no extensor lag,   Tests and Signs : negative Anterior Drawer, negative Lachman, negative Nick  Left Lower Extremity o Buttock : no tenderness, swelling or deformities  o Left Hip :    - Inspection/Palpation : no tenderness, no swelling or deformities    - Range of Motion : full and painless in all planes, no crepitance    - Stability : joint stability intact    - Strength : extension, flexion, adduction, abduction, internal rotation and external rotation, 5/5      - Tests: Brian's test negative  o Left Knee :  Inspection/Palpation : no tenderness to palpation, no swelling, incision well-healed  Range of Motion : 0-110, no crepitance, good patellofemoral glide  Stability : no valgus or varus instability present on provocative testing  Strength : flexion and extension 5/5, can SLR, no extensor lag  Tests and Signs : negative Anterior Drawer  o Peripheral Vascular System :  Dorsalis Pedis Artery : pulse 2+ bilaterally  Posterior Tibial Artery : pulse 2+ bilaterally   Gait and Station: Gait: heel/toe on the left, ambulating without a cane, no significant extremity swelling or lymphedema, good proprioception and balance, Reasonable core strength.

## 2024-09-19 NOTE — HISTORY OF PRESENT ILLNESS
[de-identified] : The patient is a 74-year-old female who returns for a bilateral knee follow-up. She is s/p right TKR on 10/3/2023 and left TKR done on 6/29/21. She is doing her exercises at home and is doing her gardening. She is not currently in physical therapy. She notes she has been working out with a . She notes her strength has improved. I stressed the importance of weight loss and its benefits to the patients joints and overall health. She denies any swelling or buckling.   Radiology Results 6/18/2024 o Right Knee : Standing AP, lateral and skyline views of the knee were obtained and revealed excellent position of the total knee replacement with central tracking of the patella.  o Left Knee : Standing AP, lateral and skyline views of the knee were obtained and revealed excellent position of the total knee replacement with central tracking of the patella.   Radiology Results 3/5/2024 o Right Knee : Standing AP, lateral and skyline views of the knee were obtained and revealed excellent position of the total knee replacement with central tracking of the patella.   Radiology Results 11/20/2023 o Right Knee : Standing AP, lateral and tunnel views of the knee were obtained and revealed excellent position of TKR with central tracking of the patella  Radiology Results 10/19/2023 o Right Knee : Standing AP, lateral and tunnel views of the knee were obtained and revealed excellent position of TKR  Radiology Results 09/27/2023 o Right Knee : Standing AP, lateral and tunnel views of the knee were obtained and revealed bone on bone in the lateral compartment with moderate patellofemoral arthritis. THESE ARE TEMPLATED FILMS  Radiology Results 05/17/2023 o Lumbosacral Spine : AP and lateral views were obtained and revealed grade 1 spondylolisthesis of L5 on S1 with diffuse degenerative disc disease worse at T11-12 and L2-3.  o Pelvis : AP pelvis was obtained and revealed minimal degenerative arthritis of both hips. o Right Knee : Standing AP, lateral, tunnel, and skyline views of the knee were obtained and revealed bone on bone in the lateral compartment with moderate patellofemoral arthritis.  o Left Knee : Standing AP, Lateral and skyline views of the knee were obtained and revealed excellent position of her left total knee replacement with central tracking of the patella. No sign of loosening or subsidence.

## 2024-09-19 NOTE — REASON FOR VISIT
[Follow-Up Visit] : a follow-up visit for [FreeTextEntry2] : s/p left TKR done on 6/29/21 and right TKR 10/2023

## 2024-09-19 NOTE — ADDENDUM
[FreeTextEntry1] : I, TAMARA PALACIOS, acted solely as a scribe for Dr. Brodie Patel on this date 09/19/2024.  All medical record entries made by the Scribe were at my, Dr. Brodie Patel, direction and personally dictated by me on 09/19/2024. I have reviewed the chart and agree that the record accurately reflects my personal performance of the history, physical exam, assessment and plan. I have also personally directed, reviewed, and agreed with the chart.

## 2024-09-19 NOTE — DISCUSSION/SUMMARY
[de-identified] : I went over the pathophysiology of the patient's symptoms in great detail with the patient. I discussed the underlying pathophysiology of the patient's condition in great detail with the patient. I went over the patient's x-rays with them in great detail. She needs to avoid high-impact activities such as running and jumping or riding a treadmill. I recommend alternative activities such as riding a stationary bike or elliptical on low tension. She should focus on light weight and high repetition exercises. She should avoid squatting and kneeling. I stressed the importance of weight loss and its benefits to the patients joints and overall health.   All of their questions were answered. They understand and consent to the plan.   FU in one year.

## 2024-10-22 ENCOUNTER — APPOINTMENT (OUTPATIENT)
Dept: GASTROENTEROLOGY | Facility: CLINIC | Age: 74
End: 2024-10-22
Payer: MEDICARE

## 2024-10-22 VITALS
HEART RATE: 85 BPM | SYSTOLIC BLOOD PRESSURE: 129 MMHG | DIASTOLIC BLOOD PRESSURE: 82 MMHG | OXYGEN SATURATION: 97 % | BODY MASS INDEX: 41.03 KG/M2 | WEIGHT: 209 LBS | HEIGHT: 60 IN

## 2024-10-22 DIAGNOSIS — K57.32 DIVERTICULITIS OF LARGE INTESTINE W/OUT PERFORATION OR ABSCESS W/OUT BLEEDING: ICD-10-CM

## 2024-10-22 DIAGNOSIS — K20.90 ESOPHAGITIS, UNSPECIFIED WITHOUT BLEEDING: ICD-10-CM

## 2024-10-22 DIAGNOSIS — K44.9 DIAPHRAGMATIC HERNIA W/OUT OBSTRUCTION OR GANGRENE: ICD-10-CM

## 2024-10-22 PROCEDURE — 99214 OFFICE O/P EST MOD 30 MIN: CPT

## 2024-10-22 RX ORDER — CEPHALEXIN 500 MG/1
500 CAPSULE ORAL 3 TIMES DAILY
Qty: 30 | Refills: 3 | Status: ACTIVE | COMMUNITY
Start: 2024-10-22 | End: 1900-01-01

## 2024-11-01 RX ORDER — CEPHALEXIN 500 MG/1
500 CAPSULE ORAL 3 TIMES DAILY
Qty: 30 | Refills: 3 | Status: ACTIVE | COMMUNITY
Start: 2024-11-01 | End: 1900-01-01

## 2024-11-19 ENCOUNTER — APPOINTMENT (OUTPATIENT)
Dept: GASTROENTEROLOGY | Facility: CLINIC | Age: 74
End: 2024-11-19
Payer: MEDICARE

## 2024-11-19 VITALS
WEIGHT: 203 LBS | HEART RATE: 95 BPM | BODY MASS INDEX: 39.85 KG/M2 | SYSTOLIC BLOOD PRESSURE: 106 MMHG | DIASTOLIC BLOOD PRESSURE: 68 MMHG | OXYGEN SATURATION: 97 % | HEIGHT: 60 IN

## 2024-11-19 DIAGNOSIS — R10.13 EPIGASTRIC PAIN: ICD-10-CM

## 2024-11-19 DIAGNOSIS — K20.90 ESOPHAGITIS, UNSPECIFIED WITHOUT BLEEDING: ICD-10-CM

## 2024-11-19 DIAGNOSIS — R10.32 LEFT LOWER QUADRANT PAIN: ICD-10-CM

## 2024-11-19 DIAGNOSIS — K44.9 DIAPHRAGMATIC HERNIA W/OUT OBSTRUCTION OR GANGRENE: ICD-10-CM

## 2024-11-19 DIAGNOSIS — K59.00 CONSTIPATION, UNSPECIFIED: ICD-10-CM

## 2024-11-19 PROCEDURE — 99214 OFFICE O/P EST MOD 30 MIN: CPT

## 2024-11-19 RX ORDER — CEFUROXIME AXETIL 500 MG/1
500 TABLET, FILM COATED ORAL
Qty: 20 | Refills: 1 | Status: ACTIVE | COMMUNITY
Start: 2024-11-19 | End: 1900-01-01

## 2024-11-25 ENCOUNTER — APPOINTMENT (OUTPATIENT)
Dept: CT IMAGING | Facility: CLINIC | Age: 74
End: 2024-11-25
Payer: MEDICARE

## 2024-11-25 ENCOUNTER — OUTPATIENT (OUTPATIENT)
Dept: OUTPATIENT SERVICES | Facility: HOSPITAL | Age: 74
LOS: 1 days | End: 2024-11-25
Payer: MEDICARE

## 2024-11-25 DIAGNOSIS — Z90.710 ACQUIRED ABSENCE OF BOTH CERVIX AND UTERUS: Chronic | ICD-10-CM

## 2024-11-25 DIAGNOSIS — R10.32 LEFT LOWER QUADRANT PAIN: ICD-10-CM

## 2024-11-25 DIAGNOSIS — Z98.84 BARIATRIC SURGERY STATUS: Chronic | ICD-10-CM

## 2024-11-25 DIAGNOSIS — Z96.652 PRESENCE OF LEFT ARTIFICIAL KNEE JOINT: Chronic | ICD-10-CM

## 2024-11-25 DIAGNOSIS — Z90.3 ACQUIRED ABSENCE OF STOMACH [PART OF]: Chronic | ICD-10-CM

## 2024-11-25 PROCEDURE — 74177 CT ABD & PELVIS W/CONTRAST: CPT

## 2024-11-25 PROCEDURE — 74177 CT ABD & PELVIS W/CONTRAST: CPT | Mod: 26,MH

## 2024-12-05 RX ORDER — CEFUROXIME AXETIL 500 MG/1
500 TABLET, FILM COATED ORAL
Qty: 20 | Refills: 1 | Status: ACTIVE | COMMUNITY
Start: 2024-12-05 | End: 1900-01-01

## 2024-12-07 ENCOUNTER — APPOINTMENT (OUTPATIENT)
Dept: GASTROENTEROLOGY | Facility: CLINIC | Age: 74
End: 2024-12-07
Payer: MEDICARE

## 2024-12-07 VITALS
DIASTOLIC BLOOD PRESSURE: 76 MMHG | HEART RATE: 79 BPM | SYSTOLIC BLOOD PRESSURE: 130 MMHG | RESPIRATION RATE: 16 BRPM | OXYGEN SATURATION: 98 % | TEMPERATURE: 98.1 F | BODY MASS INDEX: 40.95 KG/M2 | HEIGHT: 60 IN | WEIGHT: 208.56 LBS

## 2024-12-07 DIAGNOSIS — K57.32 DIVERTICULITIS OF LARGE INTESTINE W/OUT PERFORATION OR ABSCESS W/OUT BLEEDING: ICD-10-CM

## 2024-12-07 DIAGNOSIS — K44.9 DIAPHRAGMATIC HERNIA W/OUT OBSTRUCTION OR GANGRENE: ICD-10-CM

## 2024-12-07 DIAGNOSIS — R10.13 EPIGASTRIC PAIN: ICD-10-CM

## 2024-12-07 DIAGNOSIS — K20.90 ESOPHAGITIS, UNSPECIFIED WITHOUT BLEEDING: ICD-10-CM

## 2024-12-07 DIAGNOSIS — R10.32 LEFT LOWER QUADRANT PAIN: ICD-10-CM

## 2024-12-07 PROCEDURE — 99214 OFFICE O/P EST MOD 30 MIN: CPT

## 2024-12-07 RX ORDER — LACTULOSE 10 G/15ML
20 SOLUTION ORAL
Qty: 473 | Refills: 2 | Status: ACTIVE | COMMUNITY
Start: 2024-12-07 | End: 1900-01-01

## 2024-12-31 ENCOUNTER — APPOINTMENT (OUTPATIENT)
Dept: OBGYN | Facility: CLINIC | Age: 74
End: 2024-12-31
Payer: MEDICARE

## 2024-12-31 VITALS
SYSTOLIC BLOOD PRESSURE: 126 MMHG | BODY MASS INDEX: 40.84 KG/M2 | DIASTOLIC BLOOD PRESSURE: 70 MMHG | HEART RATE: 76 BPM | HEIGHT: 60 IN | WEIGHT: 208 LBS

## 2024-12-31 DIAGNOSIS — Z01.419 ENCOUNTER FOR GYNECOLOGICAL EXAMINATION (GENERAL) (ROUTINE) W/OUT ABNORMAL FINDINGS: ICD-10-CM

## 2024-12-31 DIAGNOSIS — Z85.42 PERSONAL HISTORY OF MALIGNANT NEOPLASM OF OTHER PARTS OF UTERUS: ICD-10-CM

## 2024-12-31 PROCEDURE — G0101: CPT

## 2024-12-31 RX ORDER — OLMESARTAN MEDOXOMIL 40 MG/1
TABLET, FILM COATED ORAL
Refills: 0 | Status: ACTIVE | COMMUNITY

## 2024-12-31 RX ORDER — HYDROCHLOROTHIAZIDE 12.5 MG/1
12.5 CAPSULE ORAL
Refills: 0 | Status: ACTIVE | COMMUNITY

## 2025-02-27 ENCOUNTER — NON-APPOINTMENT (OUTPATIENT)
Age: 75
End: 2025-02-27

## 2025-02-27 ENCOUNTER — APPOINTMENT (OUTPATIENT)
Dept: GASTROENTEROLOGY | Facility: CLINIC | Age: 75
End: 2025-02-27
Payer: MEDICARE

## 2025-02-27 VITALS
OXYGEN SATURATION: 95 % | HEART RATE: 74 BPM | WEIGHT: 205 LBS | DIASTOLIC BLOOD PRESSURE: 80 MMHG | SYSTOLIC BLOOD PRESSURE: 120 MMHG | BODY MASS INDEX: 40.25 KG/M2 | HEIGHT: 60 IN

## 2025-02-27 DIAGNOSIS — K57.32 DIVERTICULITIS OF LARGE INTESTINE W/OUT PERFORATION OR ABSCESS W/OUT BLEEDING: ICD-10-CM

## 2025-02-27 DIAGNOSIS — K52.9 NONINFECTIVE GASTROENTERITIS AND COLITIS, UNSPECIFIED: ICD-10-CM

## 2025-02-27 DIAGNOSIS — R10.13 EPIGASTRIC PAIN: ICD-10-CM

## 2025-02-27 DIAGNOSIS — K20.90 ESOPHAGITIS, UNSPECIFIED WITHOUT BLEEDING: ICD-10-CM

## 2025-02-27 DIAGNOSIS — R10.32 LEFT LOWER QUADRANT PAIN: ICD-10-CM

## 2025-02-27 DIAGNOSIS — K44.9 DIAPHRAGMATIC HERNIA W/OUT OBSTRUCTION OR GANGRENE: ICD-10-CM

## 2025-02-27 PROCEDURE — 99214 OFFICE O/P EST MOD 30 MIN: CPT

## 2025-02-27 RX ORDER — MESALAMINE 1.2 G/1
1.2 TABLET, DELAYED RELEASE ORAL
Qty: 60 | Refills: 3 | Status: ACTIVE | COMMUNITY
Start: 2025-02-27 | End: 1900-01-01

## 2025-03-04 ENCOUNTER — APPOINTMENT (OUTPATIENT)
Dept: SURGERY | Facility: CLINIC | Age: 75
End: 2025-03-04
Payer: MEDICARE

## 2025-03-04 VITALS
OXYGEN SATURATION: 97 % | DIASTOLIC BLOOD PRESSURE: 77 MMHG | HEIGHT: 60 IN | HEART RATE: 81 BPM | WEIGHT: 205 LBS | BODY MASS INDEX: 40.25 KG/M2 | SYSTOLIC BLOOD PRESSURE: 125 MMHG

## 2025-03-04 DIAGNOSIS — D73.89 OTHER DISEASES OF SPLEEN: ICD-10-CM

## 2025-03-04 PROBLEM — R16.1 SPLENIC MASS: Status: ACTIVE | Noted: 2025-03-04

## 2025-03-04 PROBLEM — K40.90 INGUINAL HERNIA, RIGHT: Status: ACTIVE | Noted: 2025-03-04

## 2025-03-04 PROBLEM — R10.9 RIGHT FLANK PAIN: Status: ACTIVE | Noted: 2025-03-04

## 2025-03-04 PROCEDURE — 99203 OFFICE O/P NEW LOW 30 MIN: CPT

## 2025-03-06 ENCOUNTER — APPOINTMENT (OUTPATIENT)
Dept: MRI IMAGING | Facility: CLINIC | Age: 75
End: 2025-03-06

## 2025-03-06 ENCOUNTER — OUTPATIENT (OUTPATIENT)
Dept: OUTPATIENT SERVICES | Facility: HOSPITAL | Age: 75
LOS: 1 days | End: 2025-03-06
Payer: MEDICARE

## 2025-03-06 DIAGNOSIS — Z96.652 PRESENCE OF LEFT ARTIFICIAL KNEE JOINT: Chronic | ICD-10-CM

## 2025-03-06 DIAGNOSIS — Z90.710 ACQUIRED ABSENCE OF BOTH CERVIX AND UTERUS: Chronic | ICD-10-CM

## 2025-03-06 DIAGNOSIS — Z90.3 ACQUIRED ABSENCE OF STOMACH [PART OF]: Chronic | ICD-10-CM

## 2025-03-06 DIAGNOSIS — Z98.84 BARIATRIC SURGERY STATUS: Chronic | ICD-10-CM

## 2025-03-06 DIAGNOSIS — Z00.8 ENCOUNTER FOR OTHER GENERAL EXAMINATION: ICD-10-CM

## 2025-03-06 PROCEDURE — 74183 MRI ABD W/O CNTR FLWD CNTR: CPT

## 2025-03-06 PROCEDURE — A9585: CPT

## 2025-03-06 PROCEDURE — 74183 MRI ABD W/O CNTR FLWD CNTR: CPT | Mod: 26

## 2025-03-13 ENCOUNTER — APPOINTMENT (OUTPATIENT)
Dept: SURGERY | Facility: CLINIC | Age: 75
End: 2025-03-13
Payer: MEDICARE

## 2025-03-13 DIAGNOSIS — K40.90 UNILATERAL INGUINAL HERNIA, W/OUT OBSTRUCTION OR GANGRENE, NOT SPECIFIED AS RECURRENT: ICD-10-CM

## 2025-03-13 DIAGNOSIS — R16.1 SPLENOMEGALY, NOT ELSEWHERE CLASSIFIED: ICD-10-CM

## 2025-03-13 DIAGNOSIS — R10.9 UNSPECIFIED ABDOMINAL PAIN: ICD-10-CM

## 2025-03-13 PROCEDURE — 99213 OFFICE O/P EST LOW 20 MIN: CPT

## 2025-03-27 ENCOUNTER — APPOINTMENT (OUTPATIENT)
Dept: GASTROENTEROLOGY | Facility: CLINIC | Age: 75
End: 2025-03-27

## 2025-04-07 NOTE — PATIENT PROFILE ADULT - HARM RISK FACTORS
no
